# Patient Record
Sex: MALE | Race: WHITE | NOT HISPANIC OR LATINO | Employment: OTHER | URBAN - METROPOLITAN AREA
[De-identification: names, ages, dates, MRNs, and addresses within clinical notes are randomized per-mention and may not be internally consistent; named-entity substitution may affect disease eponyms.]

---

## 2018-05-14 ENCOUNTER — RECORDS - HEALTHEAST (OUTPATIENT)
Dept: LAB | Facility: CLINIC | Age: 78
End: 2018-05-14

## 2018-05-14 LAB
ALBUMIN SERPL-MCNC: 3.5 G/DL (ref 3.5–5)
ALP SERPL-CCNC: 99 U/L (ref 45–120)
ALT SERPL W P-5'-P-CCNC: 18 U/L (ref 0–45)
ANION GAP SERPL CALCULATED.3IONS-SCNC: 10 MMOL/L (ref 5–18)
AST SERPL W P-5'-P-CCNC: 17 U/L (ref 0–40)
BASOPHILS # BLD AUTO: 0 THOU/UL (ref 0–0.2)
BASOPHILS NFR BLD AUTO: 0 % (ref 0–2)
BILIRUB SERPL-MCNC: 0.5 MG/DL (ref 0–1)
BUN SERPL-MCNC: 20 MG/DL (ref 8–28)
CALCIUM SERPL-MCNC: 9.1 MG/DL (ref 8.5–10.5)
CHLORIDE BLD-SCNC: 105 MMOL/L (ref 98–107)
CHOLEST SERPL-MCNC: 153 MG/DL
CO2 SERPL-SCNC: 26 MMOL/L (ref 22–31)
CREAT SERPL-MCNC: 1.3 MG/DL (ref 0.7–1.3)
EOSINOPHIL # BLD AUTO: 0.1 THOU/UL (ref 0–0.4)
EOSINOPHIL NFR BLD AUTO: 1 % (ref 0–6)
ERYTHROCYTE [DISTWIDTH] IN BLOOD BY AUTOMATED COUNT: 12.5 % (ref 11–14.5)
FASTING STATUS PATIENT QL REPORTED: NORMAL
GFR SERPL CREATININE-BSD FRML MDRD: 54 ML/MIN/1.73M2
GLUCOSE BLD-MCNC: 187 MG/DL (ref 70–125)
HCT VFR BLD AUTO: 28.9 % (ref 40–54)
HDLC SERPL-MCNC: 55 MG/DL
HGB BLD-MCNC: 9.9 G/DL (ref 14–18)
LDLC SERPL CALC-MCNC: 78 MG/DL
LYMPHOCYTES # BLD AUTO: 0.7 THOU/UL (ref 0.8–4.4)
LYMPHOCYTES NFR BLD AUTO: 10 % (ref 20–40)
MCH RBC QN AUTO: 32.9 PG (ref 27–34)
MCHC RBC AUTO-ENTMCNC: 34.3 G/DL (ref 32–36)
MCV RBC AUTO: 96 FL (ref 80–100)
MONOCYTES # BLD AUTO: 0.5 THOU/UL (ref 0–0.9)
MONOCYTES NFR BLD AUTO: 7 % (ref 2–10)
NEUTROPHILS # BLD AUTO: 5.4 THOU/UL (ref 2–7.7)
NEUTROPHILS NFR BLD AUTO: 82 % (ref 50–70)
PLATELET # BLD AUTO: 210 THOU/UL (ref 140–440)
PMV BLD AUTO: 11.7 FL (ref 8.5–12.5)
POTASSIUM BLD-SCNC: 3.8 MMOL/L (ref 3.5–5)
PROT SERPL-MCNC: 6.8 G/DL (ref 6–8)
RBC # BLD AUTO: 3.01 MILL/UL (ref 4.4–6.2)
SODIUM SERPL-SCNC: 141 MMOL/L (ref 136–145)
TRIGL SERPL-MCNC: 102 MG/DL
WBC: 6.7 THOU/UL (ref 4–11)

## 2019-05-16 ENCOUNTER — RECORDS - HEALTHEAST (OUTPATIENT)
Dept: LAB | Facility: CLINIC | Age: 79
End: 2019-05-16

## 2019-05-16 LAB
ALBUMIN SERPL-MCNC: 3.9 G/DL (ref 3.5–5)
ALP SERPL-CCNC: 85 U/L (ref 45–120)
ALT SERPL W P-5'-P-CCNC: 19 U/L (ref 0–45)
ANION GAP SERPL CALCULATED.3IONS-SCNC: 12 MMOL/L (ref 5–18)
AST SERPL W P-5'-P-CCNC: 21 U/L (ref 0–40)
BILIRUB SERPL-MCNC: 0.5 MG/DL (ref 0–1)
BUN SERPL-MCNC: 29 MG/DL (ref 8–28)
CALCIUM SERPL-MCNC: 9.4 MG/DL (ref 8.5–10.5)
CHLORIDE BLD-SCNC: 104 MMOL/L (ref 98–107)
CHOLEST SERPL-MCNC: 179 MG/DL
CO2 SERPL-SCNC: 22 MMOL/L (ref 22–31)
CREAT SERPL-MCNC: 1.28 MG/DL (ref 0.7–1.3)
FASTING STATUS PATIENT QL REPORTED: NORMAL
GFR SERPL CREATININE-BSD FRML MDRD: 54 ML/MIN/1.73M2
GLUCOSE BLD-MCNC: 88 MG/DL (ref 70–125)
HDLC SERPL-MCNC: 59 MG/DL
LDLC SERPL CALC-MCNC: 97 MG/DL
POTASSIUM BLD-SCNC: 4.6 MMOL/L (ref 3.5–5)
PROT SERPL-MCNC: 7 G/DL (ref 6–8)
SODIUM SERPL-SCNC: 138 MMOL/L (ref 136–145)
TRIGL SERPL-MCNC: 117 MG/DL

## 2019-11-25 ENCOUNTER — RECORDS - HEALTHEAST (OUTPATIENT)
Dept: LAB | Facility: CLINIC | Age: 79
End: 2019-11-25

## 2019-11-25 LAB
ALBUMIN SERPL-MCNC: 4 G/DL (ref 3.5–5)
ALP SERPL-CCNC: 89 U/L (ref 45–120)
ALT SERPL W P-5'-P-CCNC: 47 U/L (ref 0–45)
ANION GAP SERPL CALCULATED.3IONS-SCNC: 12 MMOL/L (ref 5–18)
AST SERPL W P-5'-P-CCNC: 41 U/L (ref 0–40)
BILIRUB SERPL-MCNC: 0.5 MG/DL (ref 0–1)
BUN SERPL-MCNC: 17 MG/DL (ref 8–28)
CALCIUM SERPL-MCNC: 9.1 MG/DL (ref 8.5–10.5)
CHLORIDE BLD-SCNC: 107 MMOL/L (ref 98–107)
CHOLEST SERPL-MCNC: 182 MG/DL
CO2 SERPL-SCNC: 20 MMOL/L (ref 22–31)
CREAT SERPL-MCNC: 0.98 MG/DL (ref 0.7–1.3)
ERYTHROCYTE [DISTWIDTH] IN BLOOD BY AUTOMATED COUNT: 12.3 % (ref 11–14.5)
FASTING STATUS PATIENT QL REPORTED: NORMAL
FERRITIN SERPL-MCNC: 98 NG/ML (ref 27–300)
GFR SERPL CREATININE-BSD FRML MDRD: >60 ML/MIN/1.73M2
GLUCOSE BLD-MCNC: 95 MG/DL (ref 70–125)
HCT VFR BLD AUTO: 39 % (ref 40–54)
HDLC SERPL-MCNC: 65 MG/DL
HGB BLD-MCNC: 13.5 G/DL (ref 14–18)
IRON SATN MFR SERPL: 16 % (ref 20–50)
IRON SERPL-MCNC: 58 UG/DL (ref 42–175)
LDLC SERPL CALC-MCNC: 103 MG/DL
MCH RBC QN AUTO: 32 PG (ref 27–34)
MCHC RBC AUTO-ENTMCNC: 34.6 G/DL (ref 32–36)
MCV RBC AUTO: 92 FL (ref 80–100)
PLATELET # BLD AUTO: 161 THOU/UL (ref 140–440)
PMV BLD AUTO: 11.9 FL (ref 8.5–12.5)
POTASSIUM BLD-SCNC: 4 MMOL/L (ref 3.5–5)
PROT SERPL-MCNC: 7.1 G/DL (ref 6–8)
RBC # BLD AUTO: 4.22 MILL/UL (ref 4.4–6.2)
SODIUM SERPL-SCNC: 139 MMOL/L (ref 136–145)
TIBC SERPL-MCNC: 372 UG/DL (ref 313–563)
TRANSFERRIN SERPL-MCNC: 298 MG/DL (ref 212–360)
TRIGL SERPL-MCNC: 68 MG/DL
WBC: 7 THOU/UL (ref 4–11)

## 2019-11-26 LAB — HBA1C MFR BLD: 5.6 % (ref 4.2–6.1)

## 2024-07-11 ENCOUNTER — APPOINTMENT (OUTPATIENT)
Dept: CT IMAGING | Facility: CLINIC | Age: 84
DRG: 064 | End: 2024-07-11
Attending: EMERGENCY MEDICINE
Payer: MEDICARE

## 2024-07-11 ENCOUNTER — APPOINTMENT (OUTPATIENT)
Dept: MRI IMAGING | Facility: CLINIC | Age: 84
DRG: 064 | End: 2024-07-11
Attending: EMERGENCY MEDICINE
Payer: MEDICARE

## 2024-07-11 ENCOUNTER — HOSPITAL ENCOUNTER (INPATIENT)
Facility: CLINIC | Age: 84
LOS: 1 days | Discharge: HOME | DRG: 064 | End: 2024-07-12
Attending: EMERGENCY MEDICINE | Admitting: INTERNAL MEDICINE
Payer: MEDICARE

## 2024-07-11 DIAGNOSIS — D50.9 IRON DEFICIENCY ANEMIA, UNSPECIFIED IRON DEFICIENCY ANEMIA TYPE: Primary | ICD-10-CM

## 2024-07-11 DIAGNOSIS — I63.9 ACUTE CVA (CEREBROVASCULAR ACCIDENT) (H): ICD-10-CM

## 2024-07-11 DIAGNOSIS — H53.8 BLURRED VISION: ICD-10-CM

## 2024-07-11 LAB
ALBUMIN SERPL BCG-MCNC: 3.7 G/DL (ref 3.5–5.2)
ALBUMIN UR-MCNC: 20 MG/DL
ALP SERPL-CCNC: 106 U/L (ref 40–150)
ALT SERPL W P-5'-P-CCNC: 10 U/L (ref 0–70)
ANION GAP SERPL CALCULATED.3IONS-SCNC: 11 MMOL/L (ref 7–15)
APPEARANCE UR: CLEAR
AST SERPL W P-5'-P-CCNC: 17 U/L (ref 0–45)
ATRIAL RATE - MUSE: 61 BPM
BASOPHILS # BLD AUTO: 0 10E3/UL (ref 0–0.2)
BASOPHILS NFR BLD AUTO: 0 %
BILIRUB DIRECT SERPL-MCNC: <0.2 MG/DL (ref 0–0.3)
BILIRUB SERPL-MCNC: 0.2 MG/DL
BILIRUB UR QL STRIP: NEGATIVE
BUN SERPL-MCNC: 20.2 MG/DL (ref 8–23)
CALCIUM SERPL-MCNC: 9.2 MG/DL (ref 8.8–10.2)
CHLORIDE SERPL-SCNC: 103 MMOL/L (ref 98–107)
COLOR UR AUTO: YELLOW
CREAT SERPL-MCNC: 0.95 MG/DL (ref 0.67–1.17)
DEPRECATED HCO3 PLAS-SCNC: 23 MMOL/L (ref 22–29)
DIASTOLIC BLOOD PRESSURE - MUSE: NORMAL MMHG
EGFRCR SERPLBLD CKD-EPI 2021: 80 ML/MIN/1.73M2
EOSINOPHIL # BLD AUTO: 0.2 10E3/UL (ref 0–0.7)
EOSINOPHIL NFR BLD AUTO: 3 %
ERYTHROCYTE [DISTWIDTH] IN BLOOD BY AUTOMATED COUNT: 14.9 % (ref 10–15)
GLUCOSE BLDC GLUCOMTR-MCNC: 111 MG/DL (ref 70–99)
GLUCOSE SERPL-MCNC: 90 MG/DL (ref 70–99)
GLUCOSE UR STRIP-MCNC: 200 MG/DL
HBA1C MFR BLD: 6.1 %
HCT VFR BLD AUTO: 30.1 % (ref 40–53)
HGB BLD-MCNC: 9.7 G/DL (ref 13.3–17.7)
HGB UR QL STRIP: NEGATIVE
HYALINE CASTS: 1 /LPF
IMM GRANULOCYTES # BLD: 0 10E3/UL
IMM GRANULOCYTES NFR BLD: 0 %
INTERPRETATION ECG - MUSE: NORMAL
IRON BINDING CAPACITY (ROCHE): 242 UG/DL (ref 240–430)
IRON SATN MFR SERPL: 9 % (ref 15–46)
IRON SERPL-MCNC: 21 UG/DL (ref 61–157)
KETONES UR STRIP-MCNC: NEGATIVE MG/DL
LEUKOCYTE ESTERASE UR QL STRIP: NEGATIVE
LYMPHOCYTES # BLD AUTO: 0.5 10E3/UL (ref 0.8–5.3)
LYMPHOCYTES NFR BLD AUTO: 8 %
MAGNESIUM SERPL-MCNC: 2 MG/DL (ref 1.7–2.3)
MCH RBC QN AUTO: 26.9 PG (ref 26.5–33)
MCHC RBC AUTO-ENTMCNC: 32.2 G/DL (ref 31.5–36.5)
MCV RBC AUTO: 84 FL (ref 78–100)
MONOCYTES # BLD AUTO: 0.6 10E3/UL (ref 0–1.3)
MONOCYTES NFR BLD AUTO: 9 %
MUCOUS THREADS #/AREA URNS LPF: PRESENT /LPF
NEUTROPHILS # BLD AUTO: 5 10E3/UL (ref 1.6–8.3)
NEUTROPHILS NFR BLD AUTO: 80 %
NITRATE UR QL: NEGATIVE
NRBC # BLD AUTO: 0 10E3/UL
NRBC BLD AUTO-RTO: 0 /100
P AXIS - MUSE: 52 DEGREES
PH UR STRIP: 5.5 [PH] (ref 5–7)
PLATELET # BLD AUTO: 212 10E3/UL (ref 150–450)
POTASSIUM SERPL-SCNC: 4.5 MMOL/L (ref 3.4–5.3)
PR INTERVAL - MUSE: 218 MS
PROT SERPL-MCNC: 7.1 G/DL (ref 6.4–8.3)
QRS DURATION - MUSE: 72 MS
QT - MUSE: 414 MS
QTC - MUSE: 416 MS
R AXIS - MUSE: 8 DEGREES
RBC # BLD AUTO: 3.6 10E6/UL (ref 4.4–5.9)
RBC URINE: <1 /HPF
SODIUM SERPL-SCNC: 137 MMOL/L (ref 135–145)
SP GR UR STRIP: 1.02 (ref 1–1.03)
SYSTOLIC BLOOD PRESSURE - MUSE: NORMAL MMHG
T AXIS - MUSE: 36 DEGREES
TROPONIN T SERPL HS-MCNC: 12 NG/L
UROBILINOGEN UR STRIP-MCNC: <2 MG/DL
VENTRICULAR RATE- MUSE: 61 BPM
WBC # BLD AUTO: 6.3 10E3/UL (ref 4–11)
WBC URINE: 1 /HPF

## 2024-07-11 PROCEDURE — 93005 ELECTROCARDIOGRAM TRACING: CPT | Performed by: EMERGENCY MEDICINE

## 2024-07-11 PROCEDURE — 70496 CT ANGIOGRAPHY HEAD: CPT | Mod: MG

## 2024-07-11 PROCEDURE — 80061 LIPID PANEL: CPT | Performed by: INTERNAL MEDICINE

## 2024-07-11 PROCEDURE — 84484 ASSAY OF TROPONIN QUANT: CPT | Performed by: EMERGENCY MEDICINE

## 2024-07-11 PROCEDURE — 99223 1ST HOSP IP/OBS HIGH 75: CPT | Mod: AI | Performed by: INTERNAL MEDICINE

## 2024-07-11 PROCEDURE — 83550 IRON BINDING TEST: CPT | Performed by: INTERNAL MEDICINE

## 2024-07-11 PROCEDURE — 250N000013 HC RX MED GY IP 250 OP 250 PS 637: Performed by: EMERGENCY MEDICINE

## 2024-07-11 PROCEDURE — 82310 ASSAY OF CALCIUM: CPT | Performed by: EMERGENCY MEDICINE

## 2024-07-11 PROCEDURE — 99285 EMERGENCY DEPT VISIT HI MDM: CPT | Mod: 25

## 2024-07-11 PROCEDURE — 82962 GLUCOSE BLOOD TEST: CPT

## 2024-07-11 PROCEDURE — 120N000001 HC R&B MED SURG/OB

## 2024-07-11 PROCEDURE — A9585 GADOBUTROL INJECTION: HCPCS | Performed by: EMERGENCY MEDICINE

## 2024-07-11 PROCEDURE — 80053 COMPREHEN METABOLIC PANEL: CPT | Performed by: EMERGENCY MEDICINE

## 2024-07-11 PROCEDURE — 85025 COMPLETE CBC W/AUTO DIFF WBC: CPT | Performed by: EMERGENCY MEDICINE

## 2024-07-11 PROCEDURE — 83036 HEMOGLOBIN GLYCOSYLATED A1C: CPT | Performed by: INTERNAL MEDICINE

## 2024-07-11 PROCEDURE — 83735 ASSAY OF MAGNESIUM: CPT | Performed by: INTERNAL MEDICINE

## 2024-07-11 PROCEDURE — 255N000002 HC RX 255 OP 636: Performed by: EMERGENCY MEDICINE

## 2024-07-11 PROCEDURE — 81001 URINALYSIS AUTO W/SCOPE: CPT | Performed by: EMERGENCY MEDICINE

## 2024-07-11 PROCEDURE — 36415 COLL VENOUS BLD VENIPUNCTURE: CPT | Performed by: EMERGENCY MEDICINE

## 2024-07-11 PROCEDURE — 70553 MRI BRAIN STEM W/O & W/DYE: CPT | Mod: MG

## 2024-07-11 PROCEDURE — 250N000011 HC RX IP 250 OP 636: Performed by: EMERGENCY MEDICINE

## 2024-07-11 PROCEDURE — 82728 ASSAY OF FERRITIN: CPT | Performed by: INTERNAL MEDICINE

## 2024-07-11 RX ORDER — AMOXICILLIN 250 MG
2 CAPSULE ORAL 2 TIMES DAILY PRN
Status: DISCONTINUED | OUTPATIENT
Start: 2024-07-11 | End: 2024-07-12 | Stop reason: HOSPADM

## 2024-07-11 RX ORDER — SIMVASTATIN 5 MG
5 TABLET ORAL AT BEDTIME
Status: DISCONTINUED | OUTPATIENT
Start: 2024-07-11 | End: 2024-07-12

## 2024-07-11 RX ORDER — ACETAMINOPHEN 650 MG/1
650 SUPPOSITORY RECTAL EVERY 4 HOURS PRN
Status: DISCONTINUED | OUTPATIENT
Start: 2024-07-11 | End: 2024-07-12 | Stop reason: HOSPADM

## 2024-07-11 RX ORDER — PROCHLORPERAZINE 25 MG
12.5 SUPPOSITORY, RECTAL RECTAL EVERY 12 HOURS PRN
Status: DISCONTINUED | OUTPATIENT
Start: 2024-07-11 | End: 2024-07-12 | Stop reason: HOSPADM

## 2024-07-11 RX ORDER — SIMVASTATIN 5 MG
5 TABLET ORAL AT BEDTIME
Status: DISCONTINUED | OUTPATIENT
Start: 2024-07-11 | End: 2024-07-11

## 2024-07-11 RX ORDER — CLOPIDOGREL BISULFATE 75 MG/1
300 TABLET ORAL ONCE
Status: COMPLETED | OUTPATIENT
Start: 2024-07-11 | End: 2024-07-11

## 2024-07-11 RX ORDER — CALCIUM CARBONATE 500 MG/1
1000 TABLET, CHEWABLE ORAL 4 TIMES DAILY PRN
Status: DISCONTINUED | OUTPATIENT
Start: 2024-07-11 | End: 2024-07-12 | Stop reason: HOSPADM

## 2024-07-11 RX ORDER — LABETALOL HYDROCHLORIDE 5 MG/ML
10-20 INJECTION, SOLUTION INTRAVENOUS EVERY 10 MIN PRN
Status: DISCONTINUED | OUTPATIENT
Start: 2024-07-11 | End: 2024-07-12 | Stop reason: HOSPADM

## 2024-07-11 RX ORDER — LIDOCAINE 40 MG/G
CREAM TOPICAL
Status: DISCONTINUED | OUTPATIENT
Start: 2024-07-11 | End: 2024-07-12 | Stop reason: HOSPADM

## 2024-07-11 RX ORDER — IOPAMIDOL 755 MG/ML
75 INJECTION, SOLUTION INTRAVASCULAR ONCE
Status: COMPLETED | OUTPATIENT
Start: 2024-07-11 | End: 2024-07-11

## 2024-07-11 RX ORDER — LISINOPRIL 10 MG/1
10 TABLET ORAL DAILY
COMMUNITY
Start: 2023-11-30 | End: 2024-07-12

## 2024-07-11 RX ORDER — CLOPIDOGREL BISULFATE 75 MG/1
75 TABLET ORAL DAILY
Status: DISCONTINUED | OUTPATIENT
Start: 2024-07-12 | End: 2024-07-12 | Stop reason: HOSPADM

## 2024-07-11 RX ORDER — ONDANSETRON 2 MG/ML
4 INJECTION INTRAMUSCULAR; INTRAVENOUS EVERY 6 HOURS PRN
Status: DISCONTINUED | OUTPATIENT
Start: 2024-07-11 | End: 2024-07-12 | Stop reason: HOSPADM

## 2024-07-11 RX ORDER — ACETAMINOPHEN 325 MG/1
650 TABLET ORAL EVERY 4 HOURS PRN
Status: DISCONTINUED | OUTPATIENT
Start: 2024-07-11 | End: 2024-07-12 | Stop reason: HOSPADM

## 2024-07-11 RX ORDER — ASPIRIN 325 MG
325 TABLET ORAL ONCE
Status: COMPLETED | OUTPATIENT
Start: 2024-07-11 | End: 2024-07-11

## 2024-07-11 RX ORDER — SODIUM CHLORIDE 9 MG/ML
INJECTION, SOLUTION INTRAVENOUS CONTINUOUS
Status: DISCONTINUED | OUTPATIENT
Start: 2024-07-11 | End: 2024-07-12

## 2024-07-11 RX ORDER — SIMVASTATIN 5 MG
5 TABLET ORAL AT BEDTIME
COMMUNITY
Start: 2023-11-30 | End: 2024-07-12

## 2024-07-11 RX ORDER — ONDANSETRON 4 MG/1
4 TABLET, ORALLY DISINTEGRATING ORAL EVERY 6 HOURS PRN
Status: DISCONTINUED | OUTPATIENT
Start: 2024-07-11 | End: 2024-07-12 | Stop reason: HOSPADM

## 2024-07-11 RX ORDER — ASPIRIN 81 MG/1
81 TABLET ORAL DAILY
Status: DISCONTINUED | OUTPATIENT
Start: 2024-07-12 | End: 2024-07-11

## 2024-07-11 RX ORDER — MULTIVITAMIN,THERAPEUTIC
1 TABLET ORAL DAILY
COMMUNITY

## 2024-07-11 RX ORDER — ASPIRIN 81 MG/1
81 TABLET, CHEWABLE ORAL DAILY
Status: DISCONTINUED | OUTPATIENT
Start: 2024-07-12 | End: 2024-07-11

## 2024-07-11 RX ORDER — ASPIRIN 325 MG
325 TABLET ORAL DAILY
Status: DISCONTINUED | OUTPATIENT
Start: 2024-07-12 | End: 2024-07-12 | Stop reason: HOSPADM

## 2024-07-11 RX ORDER — AMOXICILLIN 250 MG
1 CAPSULE ORAL 2 TIMES DAILY PRN
Status: DISCONTINUED | OUTPATIENT
Start: 2024-07-11 | End: 2024-07-12 | Stop reason: HOSPADM

## 2024-07-11 RX ORDER — GADOBUTROL 604.72 MG/ML
6 INJECTION INTRAVENOUS ONCE
Status: COMPLETED | OUTPATIENT
Start: 2024-07-11 | End: 2024-07-11

## 2024-07-11 RX ORDER — HYDRALAZINE HYDROCHLORIDE 20 MG/ML
10-20 INJECTION INTRAMUSCULAR; INTRAVENOUS
Status: DISCONTINUED | OUTPATIENT
Start: 2024-07-11 | End: 2024-07-12 | Stop reason: HOSPADM

## 2024-07-11 RX ORDER — COVID-19 ANTIGEN TEST
220 KIT MISCELLANEOUS 2 TIMES DAILY PRN
COMMUNITY
End: 2024-07-12

## 2024-07-11 RX ORDER — PROCHLORPERAZINE MALEATE 5 MG
5 TABLET ORAL EVERY 6 HOURS PRN
Status: DISCONTINUED | OUTPATIENT
Start: 2024-07-11 | End: 2024-07-12 | Stop reason: HOSPADM

## 2024-07-11 RX ADMIN — IOPAMIDOL 75 ML: 755 INJECTION, SOLUTION INTRAVENOUS at 19:28

## 2024-07-11 RX ADMIN — ASPIRIN 325 MG ORAL TABLET 325 MG: 325 PILL ORAL at 21:56

## 2024-07-11 RX ADMIN — CLOPIDOGREL BISULFATE 300 MG: 75 TABLET ORAL at 21:56

## 2024-07-11 RX ADMIN — GADOBUTROL 6 ML: 604.72 INJECTION INTRAVENOUS at 20:55

## 2024-07-11 ASSESSMENT — COLUMBIA-SUICIDE SEVERITY RATING SCALE - C-SSRS
6. HAVE YOU EVER DONE ANYTHING, STARTED TO DO ANYTHING, OR PREPARED TO DO ANYTHING TO END YOUR LIFE?: NO
1. IN THE PAST MONTH, HAVE YOU WISHED YOU WERE DEAD OR WISHED YOU COULD GO TO SLEEP AND NOT WAKE UP?: NO
2. HAVE YOU ACTUALLY HAD ANY THOUGHTS OF KILLING YOURSELF IN THE PAST MONTH?: NO

## 2024-07-11 ASSESSMENT — ACTIVITIES OF DAILY LIVING (ADL)
ADLS_ACUITY_SCORE: 35

## 2024-07-11 NOTE — ED TRIAGE NOTES
Pt c/o worsening confusion and weight loss for several months. Now experiencing intermittent blurry vision for several days. Denies falls, trauma, or LOC. Denies headache, balance issues, or speech problems.     Triage Assessment (Adult)       Row Name 07/11/24 5150          Triage Assessment    Airway WDL WDL        Respiratory WDL    Respiratory WDL WDL        Skin Circulation/Temperature WDL    Skin Circulation/Temperature WDL WDL        Cardiac WDL    Cardiac WDL WDL        Peripheral/Neurovascular WDL    Peripheral Neurovascular WDL WDL        Cognitive/Neuro/Behavioral WDL    Cognitive/Neuro/Behavioral WDL X     Level of Consciousness confused        [place] Coma Scale    Best Eye Response 4-->(E4) spontaneous     Best Motor Response 6-->(M6) obeys commands     Best Verbal Response 5-->(V5) oriented     Regi Coma Scale Score 15

## 2024-07-11 NOTE — ED PROVIDER NOTES
EMERGENCY DEPARTMENT ENCOUNTER      NAME: Juan Thompson  AGE: 84 year old male  YOB: 1940  MRN: [id number]  EVALUATION DATE & TIME: No admission date for patient encounter.    PCP: No primary care provider on file.    ED PROVIDER: Kelby Lim D.O.      Chief Complaint   Patient presents with    Altered Mental Status    Eye Problem       FINAL IMPRESSION:  1. Acute CVA (cerebrovascular accident) (H)        ED COURSE & MEDICAL DECISION MAKIN:22 PM I met with the patient to gather history and to perform my initial exam. I discussed the plan for care while in the Emergency Department.  9:33 PM Rechecked and updated patient and his daughter. Discussed plans for admission, they are both amenable.  9:38 PM Paged Stroke Neurology, Dr. Patel.  9:43 PM Discussed patient with Stroke Neurology, Dr. Patel.  9:47 PM Paged hospitalist.  10:10 PM Discussed with Dr. Hickman, hospitalist, who accepts patient for admission.          Pertinent Labs & Imaging studies reviewed. (See chart for details)  84 year old male presents to the Emergency Department for evaluation of confusion and vision changes.  Last known normal was approximately 3 days ago.  Initial exam, had an NIHSS of 0, and the patient just reporting intermittent blurry vision.  However, based on the symptoms and the concern from family for altered mental status, did workup the patient for both CVA and possible infection as well as underlying other cause of confusion.  UA was negative, and there is no obvious infectious etiology.  There was no electrolyte abnormalities that these explain his symptoms, and lab testing was otherwise largely unremarkable.  EKG did not show any evidence of arrhythmia, and he has no known history of atrial fibrillation.  CTA did show some vertebral artery occlusion, but otherwise was largely unremarkable in this patient.  Unfortunately MRI does show obvious scattered foci of acute infarct, and in an area that the  distribution would explain at least some of the patient's symptoms.  Therefore I consulted with stroke neurology who requested we start him on aspirin and Plavix and we will place other recommendations in the chart likely to evaluate the patient in the morning.  Discussed the patient with the hospitalist who agreed to the admission.    Medical Decision Making  Obtained supplemental history:Supplemental history obtained?: Family Member/Significant Other  Reviewed external records: External records reviewed?: Outpatient Record: [facility] 07/11/2024  Care impacted by chronic illness:Hyperlipidemia, Hypertension, and Other: Arthritis  Care significantly affected by social determinants of health:N/A  Did you consider but not order tests?: Work up considered but not performed and documented in chart, if applicable  Did you interpret images independently?: Independent interpretation of ECG and images noted in documentation, when applicable.  Consultation discussion with other provider:Did you involve another provider (consultant, , pharmacy, etc.)?: I discussed the care with another health care provider, see documentation for details.  Admit.    At the conclusion of the encounter I discussed the results of all of the tests and the disposition. The questions were answered. The patient or family acknowledged understanding and was agreeable with the care plan.      HPI    Patient information was obtained from: Patient and daughter.    Use of : N/A      Juan Thompson is a 84 year old male who presents with worsening confusion, vision changes, and word-finding difficulty over the past few days. Vision changes include blurry vision, intermittent black spots, and difficulty focusing. Per his daughter, the patient returned from [place] 4 days ago and was not responding to her calls after his return. She has not seen him for 2 months, so she is unsure of the timeframe for his current  "symptoms. Today, she was able to talk to him over the phone and states that he sounded confused, was asking repetitive questions, and had word-finding difficulty, which is abnormal for him. The patient stated that he was \"in crisis\" due to his vision changes, so they went to urgent care and were directed to this ED. The patient has been unsteady on his feet and has lost a significant amount of weight per his daughter. She reports worsening memory and confusion over the past year, but not to this extent.    The patient is prescribed lisinopril and simvastatin, but has not taken simvastatin for 4 days. He reports a history of arthritis. He denies numbness, tingling, or issues moving his extremities. No slurred speech per daughter.    Per Chart Review: Patient was [facility] today for evaluation of 1 day of visual changes and confusion. Patient had just returned from [place], family member reports significant weight loss and had not taken simvastatin for a few days. Patient was directed to present to this ED for further workup.      PAST MEDICAL HISTORY:  No past medical history on file.    PAST SURGICAL HISTORY:  No past surgical history on file.      CURRENT MEDICATIONS:    No current facility-administered medications for this encounter.     Current Outpatient Medications   Medication Sig Dispense Refill    diclofenac (VOLTAREN) 1 % topical gel Apply 2 g topically 4 times daily as needed for moderate pain      lisinopril (ZESTRIL) 10 MG tablet Take 10 mg by mouth daily      multivitamin, therapeutic (THERA-VIT) TABS tablet Take 1 tablet by mouth daily      naproxen sodium 220 MG capsule Take 220 mg by mouth 2 times daily as needed      simvastatin (ZOCOR) 5 MG tablet Take 5 mg by mouth at bedtime           ALLERGIES:  No Known Allergies    FAMILY HISTORY:  No family history on file.    SOCIAL HISTORY:       VITALS:  Patient Vitals for the past 24 hrs:   BP Temp Temp src Pulse Resp " "SpO2 Height Weight   07/11/24 1958 (!) 157/74 -- -- 64 18 98 % -- --   07/11/24 1943 (!) 185/81 -- -- 67 (!) 36 99 % -- --   07/11/24 1900 (!) 174/73 -- -- 65 20 96 % -- --   07/11/24 1845 (!) 148/70 -- -- 63 25 97 % -- --   07/11/24 1653 139/76 97.9  F (36.6  C) Oral 76 18 97 % 1.702 m (5' 7\") 60.8 kg (134 lb)       PHYSICAL EXAM    VITAL SIGNS: BP (!) 157/74   Pulse 64   Temp 97.9  F (36.6  C) (Oral)   Resp 18   Ht 1.702 m (5' 7\")   Wt 60.8 kg (134 lb)   SpO2 98%   BMI 20.99 kg/m      General Appearance: Well-appearing, well-nourished, no acute distress.  Head:  Normocephalic, without obvious abnormality, atraumatic  Eyes:  PERRL, conjunctiva/corneas clear, EOM's intact,  ENT:  Lips, mucosa, and tongue normal, membranes are moist without pallor  Neck:  Normal ROM, symmetrical, trachea midline    Cardio:  Regular rate and rhythm, no murmur, rub or gallop, 2+ pulses symmetric in all extremities  Pulm:  Clear to auscultation bilaterally, respirations unlabored,  Abdomen:  Soft, non-tender, no rebound or guarding.  Musculoskeletal: Full ROM, no edema, no cyanosis, good ROM of major joints  Integument:  Warm, Dry, No erythema, No rash.    Neurologic:  Patient is alert and oriented ×3.  Face is symmetric.  Speech is normal.  Visual fields are full.  Cranial nerves II through XII are grossly intact. Motor tone is 5/5 and equal in both upper and lower extremities.  Bilateral symmetric sensation grossly intact upper and lower.  Neg finger-nose. Neg heel-shin.          [employer] Stroke Scale  Exam Interval: Baseline   Score    Level of consciousness: (0)   Alert, keenly responsive    LOC questions: (0)   Answers both questions correctly    LOC commands: (0)   Performs both tasks correctly    Best gaze: (0)   Normal    Visual: (0)   No visual loss    Facial palsy: (0)   Normal symmetrical movements    Motor arm (left): (0)   No drift    Motor arm (right): (0)   No drift    Motor leg (left): (0)   " No drift    Motor leg (right): (0)   No drift    Limb ataxia: (0)   Absent    Sensory: (0)   Normal- no sensory loss    Best language: (0)   Normal- no aphasia    Dysarthria: (0)   Normal    Extinction and inattention: (0)   No abnormality        Total Score:  0       LABS  Results for orders placed or performed during the hospital encounter of 07/11/24 (from the past 24 hour(s))   Glucose by meter   Result Value Ref Range    GLUCOSE BY METER POCT 111 (H) 70 - 99 mg/dL   UA with Microscopic reflex to Culture    Specimen: Urine, Clean Catch   Result Value Ref Range    Color Urine Yellow Colorless, Straw, Light Yellow, Yellow    Appearance Urine Clear Clear    Glucose Urine 200 (A) Negative mg/dL    Bilirubin Urine Negative Negative    Ketones Urine Negative Negative mg/dL    Specific Gravity Urine 1.025 1.001 - 1.030    Blood Urine Negative Negative    pH Urine 5.5 5.0 - 7.0    Protein Albumin Urine 20 (A) Negative mg/dL    Urobilinogen Urine <2.0 <2.0 mg/dL    Nitrite Urine Negative Negative    Leukocyte Esterase Urine Negative Negative    Mucus Urine Present (A) None Seen /LPF    RBC Urine <1 <=2 /HPF    WBC Urine 1 <=5 /HPF    Hyaline Casts Urine 1 <=2 /LPF    Narrative    Urine Culture not indicated   ECG 12-LEAD WITH MUSE (LHE)   Result Value Ref Range    Systolic Blood Pressure  mmHg    Diastolic Blood Pressure  mmHg    Ventricular Rate 61 BPM    Atrial Rate 61 BPM    LA Interval 218 ms    QRS Duration 72 ms     ms    QTc 416 ms    P Axis 52 degrees    R AXIS 8 degrees    T Axis 36 degrees    Interpretation ECG       Sinus rhythm with 1st degree A-V block  Otherwise normal ECG  No previous ECGs available  Confirmed by SEE ED PROVIDER NOTE FOR, ECG INTERPRETATION (4000),  RAJINDER ZHENG ([id number]) on 7/11/2024 6:02:31 PM     CBC with platelets + differential    Narrative    The following orders were created for panel order CBC with platelets + differential.  Procedure                                Abnormality         Status                     ---------                               -----------         ------                     CBC with platelets and d...[[id number]]  Abnormal            Final result                 Please view results for these tests on the individual orders.   Basic metabolic panel   Result Value Ref Range    Sodium 137 135 - 145 mmol/L    Potassium 4.5 3.4 - 5.3 mmol/L    Chloride 103 98 - 107 mmol/L    Carbon Dioxide (CO2) 23 22 - 29 mmol/L    Anion Gap 11 7 - 15 mmol/L    Urea Nitrogen 20.2 8.0 - 23.0 mg/dL    Creatinine 0.95 0.67 - 1.17 mg/dL    GFR Estimate 80 >60 mL/min/1.73m2    Calcium 9.2 8.8 - 10.2 mg/dL    Glucose 90 70 - 99 mg/dL   Hepatic function panel   Result Value Ref Range    Protein Total 7.1 6.4 - 8.3 g/dL    Albumin 3.7 3.5 - 5.2 g/dL    Bilirubin Total 0.2 <=1.2 mg/dL    Alkaline Phosphatase 106 40 - 150 U/L    AST 17 0 - 45 U/L    ALT 10 0 - 70 U/L    Bilirubin Direct <0.20 0.00 - 0.30 mg/dL   Troponin T, High Sensitivity (now)   Result Value Ref Range    Troponin T, High Sensitivity 12 <=22 ng/L   CBC with platelets and differential   Result Value Ref Range    WBC Count 6.3 4.0 - 11.0 10e3/uL    RBC Count 3.60 (L) 4.40 - 5.90 10e6/uL    Hemoglobin 9.7 (L) 13.3 - 17.7 g/dL    Hematocrit 30.1 (L) 40.0 - 53.0 %    MCV 84 78 - 100 fL    MCH 26.9 26.5 - 33.0 pg    MCHC 32.2 31.5 - 36.5 g/dL    RDW 14.9 10.0 - 15.0 %    Platelet Count 212 150 - 450 10e3/uL    % Neutrophils 80 %    % Lymphocytes 8 %    % Monocytes 9 %    % Eosinophils 3 %    % Basophils 0 %    % Immature Granulocytes 0 %    NRBCs per 100 WBC 0 <1 /100    Absolute Neutrophils 5.0 1.6 - 8.3 10e3/uL    Absolute Lymphocytes 0.5 (L) 0.8 - 5.3 10e3/uL    Absolute Monocytes 0.6 0.0 - 1.3 10e3/uL    Absolute Eosinophils 0.2 0.0 - 0.7 10e3/uL    Absolute Basophils 0.0 0.0 - 0.2 10e3/uL    Absolute Immature Granulocytes 0.0 <=0.4 10e3/uL    Absolute NRBCs 0.0 10e3/uL   CTA Head Neck with Contrast    Narrative     EXAM: CTA HEAD NECK W CONTRAST  LOCATION: [facility]  DATE/TIME: 7/11/2024 7:36 PM CDT    INDICATION: Altered mental status  COMPARISON: None.  CONTRAST:  Isovue 370 75 mL  TECHNIQUE: Head and neck CT angiogram with IV contrast. Noncontrast head CT followed by axial helical CT images of the head and neck vessels obtained during the arterial phase of intravenous contrast administration. Axial 2D reconstructed images and   multiplanar 3D MIP reconstructed images of the head and neck vessels were performed by the technologist. Dose reduction techniques were used. All stenosis measurements made according to NASCET criteria unless otherwise specified.    FINDINGS:   NONCONTRAST HEAD CT:   INTRACRANIAL CONTENTS: No intracranial hemorrhage, extraaxial collection, or mass effect.  No CT evidence of acute infarct. Mild to moderate presumed chronic small vessel ischemic changes. Small chronic lacunar infarcts in the deep gray nuclei and left   cerebellar hemisphere. Mild to moderate generalized volume loss. No hydrocephalus.     VISUALIZED ORBITS/SINUSES/MASTOIDS: No intraorbital abnormality. No significant paranasal sinus mucosal disease. No middle ear or mastoid effusion.    BONES/SOFT TISSUES: No acute abnormality.    HEAD CTA:  ANTERIOR CIRCULATION: No stenosis/occlusion, aneurysm, or high flow vascular malformation. Standard [place] anatomy.    POSTERIOR CIRCULATION:  Ectasia and beaded appearance of the left V4 with mild stenosis proximally. No occlusion, aneurysm, or high flow vascular malformation. Dominant left vertebral artery supplies the basilar artery with a small right vertebral artery   supplying the right posterior inferior cerebellar artery (PICA).     DURAL VENOUS SINUSES: Expected enhancement of the major dural venous sinuses.    NECK CTA:  RIGHT CAROTID: Calcified atherosclerotic plaque results in less than 50% stenosis in the proximal ICA. No dissection.    LEFT CAROTID:  Calcified atherosclerotic plaque results in less than 50% stenosis in the proximal ICA. No dissection.    VERTEBRAL ARTERIES:  Soft and calcified plaque results in severe distal left V1 stenosis. Focal occlusion of the left mid V2 at C5-C6 (series 11, image 175) with distal flow seen. Calcified atherosclerosis of the distal left V2 with approximately 50%   stenosis. Tortuous beaded appearance of the left V3. Dominant left and smaller right vertebral arteries.    AORTIC ARCH: Bovine origin left common carotid artery. No significant stenosis at the origin of the great vessels.    NONVASCULAR STRUCTURES: Unremarkable.      Impression    IMPRESSION:   HEAD CT:  1.  No CT evidence for acute intracranial process.  2.  Brain atrophy and presumed chronic microvascular ischemic changes as above.    HEAD CTA:   1.  No large vessel occlusion or hemodynamically significant stenosis.    NECK CTA:  1.  Bilateral proximal ICA atherosclerosis without high-grade stenosis.  2.  Multifocal stenoses of the dominant left vertebral artery with severe stenosis at the distal V1 and focal occlusion of the mid V2. Flow is seen distal to the level of occlusion.  3.  Beaded appearance of the left V3 and V4 could be seen with fibromuscular dysplasia.   MR Brain w/o & w Contrast    Narrative    EXAM: MR BRAIN W/O and W CONTRAST  LOCATION: [facility]  DATE: 7/11/2024    INDICATION: Confusion, vision changes, word finding difficulty, for 3 days  COMPARISON: CTA head and neck 7/11/2024  CONTRAST: 6mL Gadavist  TECHNIQUE: Routine multiplanar multisequence head MRI without and with intravenous contrast.    FINDINGS:  INTRACRANIAL CONTENTS: There are several scattered patchy areas of restricted diffusion representing acute infarct involving the bilateral occipital lobes and bilateral cerebellar hemispheres. Largest area at the right occipital pole measures 15 x 10 mm.   No mass, acute hemorrhage, or extra-axial fluid  collections. Patchy and confluent nonspecific T2/FLAIR hyperintensities within the cerebral white matter most consistent with moderate chronic microvascular ischemic change. There are chronic lacunar   infarcts of the bilateral corona radiata and bilateral thalami. There are a few scattered punctate foci of susceptibility artifact favored represent hypertensive chronic microhemorrhages. Mild generalized cerebral atrophy. No hydrocephalus. Normal   position of the cerebellar tonsils. No pathologic contrast enhancement.    SELLA: No abnormality accounting for technique.    OSSEOUS STRUCTURES/SOFT TISSUES: Normal marrow signal. The major intracranial vascular flow voids are maintained.     ORBITS: No abnormality accounting for technique.     SINUSES/MASTOIDS: No paranasal sinus mucosal disease. No middle ear or mastoid effusion.       Impression    IMPRESSION:  1.  Scattered patchy areas of acute infarct involving the bilateral occipital lobes and bilateral cerebellar hemispheres. No associated hemorrhage. Chronic senescent and ischemic changes, as above.           RADIOLOGY  MR Brain w/o & w Contrast   Final Result   IMPRESSION:   1.  Scattered patchy areas of acute infarct involving the bilateral occipital lobes and bilateral cerebellar hemispheres. No associated hemorrhage. Chronic senescent and ischemic changes, as above.         CTA Head Neck with Contrast   Final Result   IMPRESSION:    HEAD CT:   1.  No CT evidence for acute intracranial process.   2.  Brain atrophy and presumed chronic microvascular ischemic changes as above.      HEAD CTA:    1.  No large vessel occlusion or hemodynamically significant stenosis.      NECK CTA:   1.  Bilateral proximal ICA atherosclerosis without high-grade stenosis.   2.  Multifocal stenoses of the dominant left vertebral artery with severe stenosis at the distal V1 and focal occlusion of the mid V2. Flow is seen distal to the level of occlusion.   3.  Beaded appearance of the  left V3 and V4 could be seen with fibromuscular dysplasia.             EKG:    Rate: 61 bpm  Rhythm: Normal Sinus Rhythm  Axis: Normal  Interval: Prolonged MT  Conduction: First-degree AV block  QRS: Normal  ST: Normal  T-wave: Normal  QT: Not prolonged  Comparison EKG: No previous available for comparison  Impression:  No acute ischemic change   I have independently reviewed and interpreted today's EKG, pending Cardiologist read    PROCEDURES:  None      MEDICATIONS GIVEN IN THE EMERGENCY:  Medications   iopamidol (ISOVUE-370) solution 75 mL (75 mLs Intravenous $Given 7/11/24 1928)   gadobutrol (GADAVIST) injection 6 mL (6 mLs Intravenous $Given 7/11/24 2055)   aspirin (ASA) tablet 325 mg (325 mg Oral $Given 7/11/24 2156)   clopidogrel (PLAVIX) tablet 300 mg (300 mg Oral $Given 7/11/24 2156)       NEW PRESCRIPTIONS STARTED AT TODAY'S ER VISIT  New Prescriptions    No medications on file        I, Alena De Souza, am serving as a scribe to document services personally performed by Kelby Lim D.O., based on my observations and the provider's statements to me.  I, Kelby Lim D.O., attest that Alena De Souza is acting in a scribe capacity, has observed my performance of the services and has documented them in accordance with my direction.     Kelby Lim D.O.  Emergency Medicine  [facility] EMERGENCY ROOM  [address] [facility] [id number]  [phone]  Dept: [phone]       Kelby Lim DO  07/11/24 7454

## 2024-07-12 ENCOUNTER — APPOINTMENT (OUTPATIENT)
Dept: OCCUPATIONAL THERAPY | Facility: CLINIC | Age: 84
DRG: 064 | End: 2024-07-12
Attending: INTERNAL MEDICINE
Payer: MEDICARE

## 2024-07-12 ENCOUNTER — APPOINTMENT (OUTPATIENT)
Dept: PHYSICAL THERAPY | Facility: CLINIC | Age: 84
DRG: 064 | End: 2024-07-12
Attending: INTERNAL MEDICINE
Payer: MEDICARE

## 2024-07-12 ENCOUNTER — APPOINTMENT (OUTPATIENT)
Dept: CARDIOLOGY | Facility: CLINIC | Age: 84
DRG: 064 | End: 2024-07-12
Payer: MEDICARE

## 2024-07-12 ENCOUNTER — APPOINTMENT (OUTPATIENT)
Dept: CT IMAGING | Facility: CLINIC | Age: 84
DRG: 064 | End: 2024-07-12
Payer: MEDICARE

## 2024-07-12 ENCOUNTER — APPOINTMENT (OUTPATIENT)
Dept: CARDIOLOGY | Facility: CLINIC | Age: 84
DRG: 064 | End: 2024-07-12
Attending: INTERNAL MEDICINE
Payer: MEDICARE

## 2024-07-12 VITALS
HEART RATE: 74 BPM | BODY MASS INDEX: 21.03 KG/M2 | HEIGHT: 67 IN | RESPIRATION RATE: 28 BRPM | DIASTOLIC BLOOD PRESSURE: 77 MMHG | OXYGEN SATURATION: 98 % | WEIGHT: 134 LBS | TEMPERATURE: 97.8 F | SYSTOLIC BLOOD PRESSURE: 170 MMHG

## 2024-07-12 LAB
ANION GAP SERPL CALCULATED.3IONS-SCNC: 10 MMOL/L (ref 7–15)
BUN SERPL-MCNC: 15 MG/DL (ref 8–23)
CALCIUM SERPL-MCNC: 8.7 MG/DL (ref 8.8–10.2)
CHLORIDE SERPL-SCNC: 103 MMOL/L (ref 98–107)
CHOLEST SERPL-MCNC: 128 MG/DL
CREAT SERPL-MCNC: 0.87 MG/DL (ref 0.67–1.17)
DEPRECATED HCO3 PLAS-SCNC: 24 MMOL/L (ref 22–29)
EGFRCR SERPLBLD CKD-EPI 2021: 86 ML/MIN/1.73M2
ERYTHROCYTE [DISTWIDTH] IN BLOOD BY AUTOMATED COUNT: 14.8 % (ref 10–15)
FERRITIN SERPL-MCNC: 389 NG/ML (ref 31–409)
GLUCOSE BLDC GLUCOMTR-MCNC: 71 MG/DL (ref 70–99)
GLUCOSE SERPL-MCNC: 96 MG/DL (ref 70–99)
HCT VFR BLD AUTO: 27.6 % (ref 40–53)
HDLC SERPL-MCNC: 54 MG/DL
HGB BLD-MCNC: 9 G/DL (ref 13.3–17.7)
LDLC SERPL CALC-MCNC: 60 MG/DL
LVEF ECHO: NORMAL
MCH RBC QN AUTO: 27.2 PG (ref 26.5–33)
MCHC RBC AUTO-ENTMCNC: 32.6 G/DL (ref 31.5–36.5)
MCV RBC AUTO: 83 FL (ref 78–100)
NONHDLC SERPL-MCNC: 74 MG/DL
PLATELET # BLD AUTO: 179 10E3/UL (ref 150–450)
POTASSIUM SERPL-SCNC: 3.8 MMOL/L (ref 3.4–5.3)
RBC # BLD AUTO: 3.31 10E6/UL (ref 4.4–5.9)
SODIUM SERPL-SCNC: 137 MMOL/L (ref 135–145)
TRIGL SERPL-MCNC: 70 MG/DL
WBC # BLD AUTO: 5.2 10E3/UL (ref 4–11)

## 2024-07-12 PROCEDURE — 250N000013 HC RX MED GY IP 250 OP 250 PS 637: Performed by: INTERNAL MEDICINE

## 2024-07-12 PROCEDURE — 85027 COMPLETE CBC AUTOMATED: CPT | Performed by: INTERNAL MEDICINE

## 2024-07-12 PROCEDURE — 82962 GLUCOSE BLOOD TEST: CPT

## 2024-07-12 PROCEDURE — 258N000003 HC RX IP 258 OP 636: Performed by: FAMILY MEDICINE

## 2024-07-12 PROCEDURE — 999N000096 CARDIAC MOBILE TELEMETRY MONITOR

## 2024-07-12 PROCEDURE — 71260 CT THORAX DX C+: CPT | Mod: MG

## 2024-07-12 PROCEDURE — 97162 PT EVAL MOD COMPLEX 30 MIN: CPT | Mod: GP

## 2024-07-12 PROCEDURE — 999N000226 HC STATISTIC SLP IP EVAL DEFER

## 2024-07-12 PROCEDURE — 99239 HOSP IP/OBS DSCHRG MGMT >30: CPT | Performed by: FAMILY MEDICINE

## 2024-07-12 PROCEDURE — 97165 OT EVAL LOW COMPLEX 30 MIN: CPT | Mod: GO

## 2024-07-12 PROCEDURE — 97530 THERAPEUTIC ACTIVITIES: CPT | Mod: GP

## 2024-07-12 PROCEDURE — 80048 BASIC METABOLIC PNL TOTAL CA: CPT | Performed by: INTERNAL MEDICINE

## 2024-07-12 PROCEDURE — 97116 GAIT TRAINING THERAPY: CPT | Mod: GP

## 2024-07-12 PROCEDURE — 255N000002 HC RX 255 OP 636: Performed by: FAMILY MEDICINE

## 2024-07-12 PROCEDURE — 36415 COLL VENOUS BLD VENIPUNCTURE: CPT | Performed by: INTERNAL MEDICINE

## 2024-07-12 PROCEDURE — C8929 TTE W OR WO FOL WCON,DOPPLER: HCPCS

## 2024-07-12 PROCEDURE — G0427 INPT/ED TELECONSULT70: HCPCS | Mod: G0

## 2024-07-12 PROCEDURE — 93306 TTE W/DOPPLER COMPLETE: CPT | Mod: 26 | Performed by: INTERNAL MEDICINE

## 2024-07-12 PROCEDURE — 258N000003 HC RX IP 258 OP 636: Performed by: INTERNAL MEDICINE

## 2024-07-12 PROCEDURE — 250N000011 HC RX IP 250 OP 636: Performed by: FAMILY MEDICINE

## 2024-07-12 PROCEDURE — 250N000013 HC RX MED GY IP 250 OP 250 PS 637

## 2024-07-12 RX ORDER — ROSUVASTATIN CALCIUM 5 MG/1
5 TABLET, COATED ORAL DAILY
Status: DISCONTINUED | OUTPATIENT
Start: 2024-07-12 | End: 2024-07-12 | Stop reason: HOSPADM

## 2024-07-12 RX ORDER — ASPIRIN 81 MG/1
81 TABLET ORAL DAILY
Qty: 90 TABLET | Refills: 3 | Status: SHIPPED | OUTPATIENT
Start: 2024-07-12

## 2024-07-12 RX ORDER — FERROUS SULFATE 325(65) MG
325 TABLET ORAL EVERY OTHER DAY
Qty: 45 TABLET | Refills: 0 | Status: SHIPPED | OUTPATIENT
Start: 2024-07-12

## 2024-07-12 RX ORDER — IOPAMIDOL 755 MG/ML
90 INJECTION, SOLUTION INTRAVASCULAR ONCE
Status: COMPLETED | OUTPATIENT
Start: 2024-07-12 | End: 2024-07-12

## 2024-07-12 RX ORDER — ROSUVASTATIN CALCIUM 5 MG/1
5 TABLET, COATED ORAL DAILY
Qty: 30 TABLET | Refills: 3 | Status: SHIPPED | OUTPATIENT
Start: 2024-07-13

## 2024-07-12 RX ORDER — CLOPIDOGREL BISULFATE 75 MG/1
75 TABLET ORAL DAILY
Qty: 20 TABLET | Refills: 0 | Status: SHIPPED | OUTPATIENT
Start: 2024-07-13 | End: 2024-08-02

## 2024-07-12 RX ORDER — LISINOPRIL 10 MG/1
10 TABLET ORAL DAILY
COMMUNITY
Start: 2024-07-12

## 2024-07-12 RX ORDER — ACETAMINOPHEN 325 MG/1
650 TABLET ORAL EVERY 6 HOURS PRN
COMMUNITY
Start: 2024-07-12

## 2024-07-12 RX ORDER — DIPHENHYDRAMINE HYDROCHLORIDE 50 MG/ML
50 INJECTION INTRAMUSCULAR; INTRAVENOUS
Status: DISCONTINUED | OUTPATIENT
Start: 2024-07-12 | End: 2024-07-12 | Stop reason: HOSPADM

## 2024-07-12 RX ADMIN — ROSUVASTATIN CALCIUM 5 MG: 5 TABLET, FILM COATED ORAL at 14:35

## 2024-07-12 RX ADMIN — CLOPIDOGREL BISULFATE 75 MG: 75 TABLET ORAL at 08:50

## 2024-07-12 RX ADMIN — SIMVASTATIN 5 MG: 5 TABLET, FILM COATED ORAL at 00:47

## 2024-07-12 RX ADMIN — PERFLUTREN 2 ML: 6.52 INJECTION, SUSPENSION INTRAVENOUS at 10:12

## 2024-07-12 RX ADMIN — SODIUM CHLORIDE, PRESERVATIVE FREE: 5 INJECTION INTRAVENOUS at 00:47

## 2024-07-12 RX ADMIN — SODIUM CHLORIDE 25 MG: 9 INJECTION, SOLUTION INTRAVENOUS at 08:48

## 2024-07-12 RX ADMIN — ASPIRIN 325 MG ORAL TABLET 325 MG: 325 PILL ORAL at 08:49

## 2024-07-12 RX ADMIN — IOPAMIDOL 90 ML: 755 INJECTION, SOLUTION INTRAVENOUS at 10:22

## 2024-07-12 RX ADMIN — SODIUM CHLORIDE 975 MG: 9 INJECTION, SOLUTION INTRAVENOUS at 11:58

## 2024-07-12 ASSESSMENT — ACTIVITIES OF DAILY LIVING (ADL)
ADLS_ACUITY_SCORE: 38
ADLS_ACUITY_SCORE: 35
ADLS_ACUITY_SCORE: 38
ADLS_ACUITY_SCORE: 38
ADLS_ACUITY_SCORE: 35
ADLS_ACUITY_SCORE: 38
ADLS_ACUITY_SCORE: 35
ADLS_ACUITY_SCORE: 35
ADLS_ACUITY_SCORE: 38
DEPENDENT_IADLS:: TRANSPORTATION
ADLS_ACUITY_SCORE: 35
ADLS_ACUITY_SCORE: 38
ADLS_ACUITY_SCORE: 38
ADLS_ACUITY_SCORE: 35
ADLS_ACUITY_SCORE: 38

## 2024-07-12 NOTE — PROGRESS NOTES
07/12/24 1430   Appointment Info   Signing Clinician's Name / Credentials (OT) Nubia Raymundo, OTR/L   Living Environment   People in Home alone  (has a suportive friend, Kendy, who is like a daughter to him)   Current Living Arrangements other (see comments)  (condo)   Living Environment Comments pt has walk in shower and standard toilet   Self-Care   Activity/Exercise/Self-Care Comment Pt was ind with adls and iadls prior to admit   Instrumental Activities of Daily Living (IADL)   IADL Comments pt just drove back from [place] on sunday.  He stays there six months of the year   General Information   Onset of Illness/Injury or Date of Surgery 07/11/24   Referring Physician Paulette Guerra   Patient/Family Therapy Goal Statement (OT) go home asap   Additional Occupational Profile Info/Pertinent History of Current Problem Mr.Thomas IRVING Thompson is a 84 year old male with a past medical history of hypertension, dyslipidemia, came with blurred vision and confusion.  MRI brain revealed scattered patchy areas of acute infarct involving the bilateral occipital lobes and bilateral cerebellar hemispheres. No associated hemorrhage.   Cognitive Status Examination   Affect/Mental Status (Cognitive) WNL   Range of Motion Comprehensive   General Range of Motion no range of motion deficits identified   Strength Comprehensive (MMT)   General Manual Muscle Testing (MMT) Assessment no strength deficits identified   Bed Mobility   Bed Mobility No deficits identified   Transfers   Transfers No deficits identified   Activities of Daily Living   BADL Assessment/Intervention no deficits identified   Clinical Impression   Criteria for Skilled Therapeutic Interventions Met (OT) Evaluation only;No problems identified which require skilled intervention   OT Total Evaluation Time   OT Eval, Low Complexity Minutes (95918) 15   OT Discharge Planning   OT Plan dc OT   OT Discharge Recommendation (DC Rec) (S)  home   OT Rationale for DC Rec Pt seen for  OT eval only-pt has no skilled OT needs at this time   Total Session Time   Total Session Time (sum of timed and untimed services) 15

## 2024-07-12 NOTE — PLAN OF CARE
"  Problem: Stroke, Ischemic (Includes Transient Ischemic Attack)  Goal: Optimal Functional Ability  7/12/2024 0731 by Joann Bustillo, RN  Outcome: Progressing  7/12/2024 0731 by Joann Bustillo, RN  Outcome: Not Progressing  Intervention: Optimize Functional Ability  Recent Flowsheet Documentation  Taken 7/12/2024 0545 by Joann Bustillo, RN  Activity Management: activity adjusted per tolerance  Taken 7/12/2024 0052 by Joann Bustillo, RN  Activity Management: activity adjusted per tolerance     Goal Outcome Evaluation:  BP (!) 181/72 (BP Location: Left arm, Patient Position: Semi-Nunez's, Cuff Size: Adult Regular)   Pulse 66   Temp 98.1  F (36.7  C) (Oral)   Resp 18   Ht 1.702 m (5' 7\")   Wt 60.8 kg (134 lb)   SpO2 97%   BMI 20.99 kg/m    Pt is a/ox4, can be forgetful. Pt denied any pain, SOB, dizziness, and n/v. Pt complained of not wanting to stay but agreed to stay overnight until neurostroke eval. Pt plan to discharge today home, per pt he lives a lone. Pt ambulates without difficulty, tolerate oral intake.  Cardiac, Sr w/ PVC to 1st degree  Neuro: intact                      "

## 2024-07-12 NOTE — PROGRESS NOTES
07/12/24 1130   Appointment Info   Signing Clinician's Name / Credentials (PT) Mary Gaxiola, PT, DPT   Living Environment   People in Home alone   Current Living Arrangements other (see comments)  (condo)   Home Accessibility stairs to enter home   Number of Stairs, Main Entrance greater than 10 stairs  (15)   Stair Railings, Main Entrance railings on both sides of stairs   Self-Care   Usual Activity Tolerance good   Current Activity Tolerance moderate   Equipment Currently Used at Home none   General Information   Onset of Illness/Injury or Date of Surgery 07/11/24   Referring Physician Paulette Guerra MD   Pertinent History of Current Problem (include personal factors and/or comorbidities that impact the POC) s/p CVA with dizziness and visual disturbance   Existing Precautions/Restrictions no known precautions/restrictions   Weight-Bearing Status - LLE weight-bearing as tolerated   Weight-Bearing Status - RLE weight-bearing as tolerated   Range of Motion (ROM)   ROM Comment WFL   Strength (Manual Muscle Testing)   Strength Comments WFL   Bed Mobility   Bed Mobility supine-sit;sit-supine   Supine-Sit [place] (Bed Mobility) supervision;verbal cues   Sit-Supine [place] (Bed Mobility) supervision;verbal cues   Transfers   Transfers sit-stand transfer   Sit-Stand Transfer   Sit-Stand [place] (Transfers) contact guard;verbal cues   Assistive Device (Sit-Stand Transfers) other (see comments)  (None)   Gait/Stairs (Locomotion)   [place] Level (Gait) contact guard;verbal cues   Assistive Device (Gait) other (see comments)  (none)   Distance in Feet (Gait) 5'   Pattern (Gait) step-to;step-through   Deviations/Abnormal Patterns (Gait) joseph decreased;gait speed decreased;stride length decreased   Clinical Impression   Criteria for Skilled Therapeutic Intervention Yes, treatment indicated   PT Diagnosis (PT) impaired functional mobility   Influenced by the following impairments weakness, pain,  impaired balance   Functional limitations due to impairments gait, stairs, transfers   Clinical Presentation (PT Evaluation Complexity) stable   Clinical Presentation Rationale pt presents as medically diagnosed   Clinical Decision Making (Complexity) moderate complexity   Planned Therapy Interventions (PT) balance training;bed mobility training;gait training;home exercise program;patient/family education;ROM (range of motion);stair training;strengthening;transfer training   Risk & Benefits of therapy have been explained care plan/treatment goals reviewed;patient   PT Total Evaluation Time   PT Eval, Moderate Complexity Minutes (31303) 10   Physical Therapy Goals   PT Frequency Daily   PT Predicted Duration/Target Date for Goal Attainment 07/19/24   PT Goals Transfers;Gait;Stairs   PT: Transfers Independent;Sit to/from stand   PT: Gait Independent;150 feet;Assistive device  (SPC)   PT: Stairs Supervision/stand-by assist;Rail on right;Greater than 10 stairs  (15)   Interventions   Interventions Quick Adds Gait Training;Therapeutic Activity   Therapeutic Activity   Therapeutic Activities: dynamic activities to improve functional performance Minutes (84527) 14   Treatment Detail/Skilled Intervention supine to sit x2 reps, HOB elevated, SBA, tolerates well, sitting EOB x2 min, cueing for posture and UE/LE support, sit to/from stand, x3 reps, CGA for initial rep and SBA for remaining 2 transitions, pt steady, cueing for hand placement, standing balance x3 min without UE support, pt steady with no LOB, sit to supine x2 reps, independently   Gait Training   Gait Training Minutes (95522) 13   Symptoms Noted During/After Treatment (Gait Training) none   Treatment Detail/Skilled Intervention cueing for posture and safety, educated on FWW vs SPC, pt expressed interest in SPC for balance at home but declines interest in walker, pt steady, limited in distance by IV connection to bed, STAIRS: 15 stairs up/down with unilateral  rail, non-reciprocal patterning, steady, no LOB, SBA   Distance in Feet 20'   [place] Level (Gait Training) stand-by assist   Physical Assistance Level (Gait Training) supervision;verbal cues   Weight Bearing (Gait Training) full weight-bearing   Assistive Device (Gait Training) other (see comments)  (None)   Pattern Analysis (Gait Training) swing-to gait   Gait Analysis Deviations decreased joseph;decreased stride length;decreased step length   PT Discharge Planning   PT Plan gait (SPC vs no AD)   PT Discharge Recommendation (DC Rec) (S)  home   PT Rationale for DC Rec home with assist from family as needed   PT Brief overview of current status ambulating 20', 15 stairs with rail, SBA   PT Equipment Needed at Discharge cane, straight  (pt may want cane for home at d/c)   Total Session Time   Timed Code Treatment Minutes 27   Total Session Time (sum of timed and untimed services) 37

## 2024-07-12 NOTE — CONSULTS
"      [facility]    Stroke Consult Note    Reason for Consult:  Stroke    Chief Complaint: Altered Mental Status and Eye Problem     HPI  Juan Thompson is a 84 year old male with a PMH significant for HLD, HTN, chronic anemia. Juan presented on 7/11 with confusion and blurred vision. History obtained from Juan and Kendy (daughter). Last known well was Zaheer evening.  Juan returned from [place] on Zaheer evening.  He traveled back via car with Kendy's  and was last seen by family on Sunday.  Kendy attempted to call Juan on Monday, Tuesday, and Wednesday but there was no answer.  On Thursday morning Juan did answer her phone call and was noted to feel \"discombobulated\" and seeing spots in his vision.  Kendy also added that when they were leaving his condo to present to the ED that he needed to hang on to the walls for support when walking. Juan is unsure when the spots in his vision first started.  Juan denied any associated unilateral weakness, numbness, headache, or speech changes.  He denies any prior episodes of his presenting symptoms.  MRI brain revealed bilateral cerebellar, occipital lobe infarcts as well as on independent review with Dr. Saldaña additional left cortical frontotemporal subacute infarcts.  CTA revealed an occlusion of the mid V2 segment of the left vertebral artery with some flow is seen distal to the level of the occlusion. Presenting BP was 139/76.  Juan reports he was previously taking aspirin but was not prior to arrival.  He is on simvastatin.    Juan reports continuing to see black spots in his vision especially when watching TV yesterday evening.  He notes his vision also has wavy lines.  He denied any new neurologic symptoms today.  At baseline Juan lives independently and splits his time in [place], and [place] at his condo, and at his cabin up [place].    Reviewed Vascular Risk Factors:   - Tobacco use: Non-smoker. Quit 50 " years ago.  - Personal history of stroke/TIA: No   - Alcohol use: Reports consuming 1-3 eunice and [employer] per day, but his daughter reported greater than 3/day.  - Diet: Typically eats 2 meals per day breakfast and dinner.  For breakfast he has hash brown, egg, toast and a glass of juice.  For dinner it varies between [employer], TV dinners, chow mein.  He and Kendy report recent weight loss over the last few months which is attributed to him being in the [place] heat with little air conditioning.   - Living: Condo, cabin up [place]   - Exercise: Minimal activty, used to walk 1 mile per day but has stopped over the last 2 years   - Blood pressure: Tries to check it daily with average SBP numbers 123-125  - Atrial fibrillation: Denies family history or personal history  - B symptoms: Denies recent fevers, chills, fatigue, night sweats.  No   - Pre-cancer screenings: Not up to date, only goes to a provider once a year   - ЕЛЕНА Screening questions: Denies snoring, daytime sleepiness, and feels well rested in the AM     Stroke Evaluation Summarized  MRI/Head CT MRI Brain: Bilateral cerebellar and occipital lobe infarct. On independent review of with my attending additional subacute appearing left cortical frontal/temporal AKSHAT/MCA infarcts  CTH: No evidence of hemorrhage.    Intracranial Vasculature CTA Head: No LVO or significant stenosis    Cervical Vasculature CTA Neck: Bilateral proximal ICA atherosclerosis without significant stenosis, Multifocal stenosis of the left vertebral artery with severe stenosis at the distal V1 and focal occlusion of the mid V2, some flow is seen distal to the level of the occlusion, beaded appearance of the left V3 and V4      Echocardiogram Left ventricular function is normal. EF 55-60%. Normal right ventricle size and systolic function. The left atrium is mildly dilated. Right atrial size is normal. No evidence of left ventricular mass or tumors    EKG/Telemetry Sinus rhythm with 1st  degree A-V block    Other Testing CT CAP  1.  No acute or suspicious findings in the chest, abdomen or pelvis. No findings suspicious for a malignant process.  2.  Indeterminant but likely benign 7 mm segment 7 hepatic lesion which may represent a cyst or hemangioma. This can be followed with liver ultrasound.     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), query paroxysmal atrial fibrillation due to mild left atrium dilation on echo, CT CAP negative for obvious malignancy     Left vertebral artery mid V2 occlusion      Recommendations   - Acute stroke management  - Neuro checks and vital signs every 4 hours  - Continue SBP <200, avoid periods of hypotension given focal ICAD  - Continue Plavix 75 mg and aspirin 81mg daily together for 21 days; then aspirin 81mg alone indefinitely  - LDL 60 (goal 40-70); Initiate Crestor 5 mg, transitioned PTA simvastatin 5 mg to high intensity statin    - Long term goal BP is <130/80 to be achieved as outpatient within several weeks. Tighter control associated with improved vascular outcomes; recommend home blood pressure monitoring and keeping a BP log for primary care follow up  - Blood glucose monitoring, Hgb A1c 6.1%, (goal <7%), follow-up with PCP regarding pre-diabetes management   - Encourage Mediterranean diet and daily exercise  - PT/OT/ST consults  - Bedside Glucose Monitoring  - Euthermia, Euglycemia   - Education: Reviewed [facility] stroke warning signs with Cynthia  - Limit alcohol consumption     Diagnostic testing  - Continue telemetry while inpatient  - Check CT c/a/p to evaluate for any occult malignancy causing hypercoagulability (ordered, results above)  - MOCT Cardiac monitor for 30 days  "(ordered), to be placed before to discharge    - If Cardiac monitor is negative for arrhythmia would recommend longer cardiac monitoring with implantable loop recorder    Patient Follow-up    - in the next 1-2 week(s) with PCP  - in 6-8 weeks with general neurology or stroke JOSE ([phone]) (ordered)  - Recommend following up with ophthalmology for formal visual field testing     Thank you for this consult. No further stroke evaluation is recommended, so we will sign off. Please contact us with any additional questions.    Tracy Charles PA-C  Vascular Neurology    To page me or covering stroke neurology team member, click here: [facility]  Choose \"On Call\" tab at top, then select \"NEUROLOGY/ALL SITES\" from middle drop-down box, press Enter, then look for \"stroke\" or \"telestroke\" for your site.  _____________________________________________________    Clinically Significant Risk Factors Present on Admission                  # Hypertension: Home medication list includes antihypertensive(s)    # Anemia: based on hgb <11                       Past Medical History    No past medical history on file.  Medications   Home Meds  Prior to Admission medications    Medication Sig Start Date End Date Taking? Authorizing Provider   diclofenac (VOLTAREN) 1 % topical gel Apply 2 g topically 4 times daily as needed for moderate pain   Yes Reported, Patient   lisinopril (ZESTRIL) 10 MG tablet Take 10 mg by mouth daily 11/30/23  Yes Reported, Patient   multivitamin, therapeutic (THERA-VIT) TABS tablet Take 1 tablet by mouth daily   Yes Reported, Patient   naproxen sodium 220 MG capsule Take 220 mg by mouth 2 times daily as needed   Yes Reported, Patient   simvastatin (ZOCOR) 5 MG tablet Take 5 mg by mouth at bedtime 11/30/23  Yes Reported, Patient       Scheduled Meds  Current Facility-Administered Medications   Medication Dose Route Frequency Provider Last Rate Last Admin    aspirin (ASA) tablet 325 mg  325 mg Oral Daily " Livan Hickman MD   325 mg at 07/12/24 0849    clopidogrel (PLAVIX) tablet 75 mg  75 mg Oral or NG Tube Daily Livan Hickman MD   75 mg at 07/12/24 0850    iron dextran complex (INFED) 975 mg in sodium chloride 0.9 % 294.5 mL intermittent infusion  975 mg Intravenous Once Paulette Guerra MD 73.6 mL/hr at 07/12/24 1158 975 mg at 07/12/24 1158    rosuvastatin (CRESTOR) tablet 5 mg  5 mg Oral Daily Tracy Ceja PA-C        sodium chloride (PF) 0.9% PF flush 3 mL  3 mL Intracatheter Q8H Livan Hickman MD   3 mL at 07/12/24 0048    sodium chloride (PF) 0.9% PF flush 3 mL  3 mL Intracatheter Q8H Livan Hickman MD           Infusion Meds  Current Facility-Administered Medications   Medication Dose Route Frequency Provider Last Rate Last Admin    sodium chloride 0.9 % infusion   Intravenous Continuous Livan Hickman MD   Paused at 07/12/24 1202       Allergies   No Known Allergies       PHYSICAL EXAMINATION   Temp:  [97.8  F (36.6  C)-98.5  F (36.9  C)] 97.8  F (36.6  C)  Pulse:  [63-76] 74  Resp:  [18-36] 28  BP: (139-185)/(70-94) 170/77  SpO2:  [96 %-99 %] 98 %    General Exam  General:  patient lying in bed without any acute distress    HEENT:  normocephalic/atraumatic  Pulmonary:  no respiratory distress    Neuro Exam  Mental Status:  alert, oriented to age, month, current president, not 2nd most recent president, able to spell world backwards, able to correctly name $1.75, able to say the days of the week, follows commands, speech clear and fluent, naming and repetition normal  Cranial Nerves:  visual fields intact (tested by nurse), EOMI with normal smooth pursuit, facial sensation intact and symmetric (tested by nurse), facial movements symmetric, hearing not formally tested but intact to conversation, no dysarthria, tongue protrusion midline  Motor:  bilateral upper extremity tremors, increased effort with bilateral upper extremity elevation, no pronator drift, no leg drift     Reflexes:   unable to test (telestroke)  Sensory:  light touch sensation intact and symmetric throughout upper and lower extremities (assessed by nurse), no extinction on double simultaneous stimulation (assessed by nurse)  Coordination:  normal finger-to-nose without dysmetria, mild LLE ataxia present on heel-to-shin, normal right heel to shin   Station/Gait:  unable to test due to telestroke    Stroke Scales  NIHSS  1a. Level of Consciousness 0-->Alert, keenly responsive   1b. LOC Questions 0-->Answers both questions correctly   1c. LOC Commands 0-->Performs both tasks correctly   2.   Best Gaze 0-->Normal   3.   Visual 0-->No visual loss   4.   Facial Palsy 0-->Normal symmetrical movements   5a. Motor Arm, Left 0-->No drift, limb holds 90 (or 45) degrees for full 10 secs   5b. Motor Arm, Right 0-->No drift, limb holds 90 (or 45) degrees for full 10 secs   6a. Motor Leg, Left 0-->No drift, leg holds 30 degree position for full 5 secs   6b. Motor Leg, right 0-->No drift, leg holds 30 degree position for full 5 secs   7.   Limb Ataxia 1-->Present in one limb (LLE ataxia)   8.   Sensory 0-->Normal, no sensory loss   9.   Best Language 0-->No aphasia, normal   10. Dysarthria 0-->Normal   11. Extinction and Inattention  0-->No abnormality   Total 1 (07/12/24 1206)     Imaging  I personally reviewed all imaging; relevant findings per HPI.    Labs Data   CBC  Recent Labs   Lab 07/12/24  0605 07/11/24  1812   WBC 5.2 6.3   RBC 3.31* 3.60*   HGB 9.0* 9.7*   HCT 27.6* 30.1*    212     Basic Metabolic Panel   Recent Labs   Lab 07/12/24  0740 07/12/24  0605 07/11/24  1812   NA  --  137 137   POTASSIUM  --  3.8 4.5   CHLORIDE  --  103 103   CO2  --  24 23   BUN  --  15.0 20.2   CR  --  0.87 0.95   GLC 71 96 90   MAYKEL  --  8.7* 9.2     Liver Panel  Recent Labs   Lab 07/11/24  1812   PROTTOTAL 7.1   ALBUMIN 3.7   BILITOTAL 0.2   ALKPHOS 106   AST 17   ALT 10     INR  No lab results found.        Stroke Consult Data Data   Telestroke  Service Details  (for non-emergent stroke consult with tele)  Video start time 07/12/24   1157   Video end time 07/12/24   1301   Type of service telemedicine diagnostic assessment of acute neurological changes   Reason telemedicine is appropriate patient requires assessment with a specialist for diagnosis and treatment of neurological symptoms   Mode of transmission secure interactive audio and video communication per Itzel   Originating site (patient location) [facility]    Distant site (provider location) [facility]     I have personally spent a total of 80 minutes providing care today, time spent in reviewing medical records and devising the plan as recorded above.

## 2024-07-12 NOTE — CONSULTS
"[facility]    Stroke Telephone Note    I was called by Kelby Lim on 07/11/24 regarding patient Juan Thompson. The patient is a 84 year old male coming with confusion and blurry vision. LKW was 3 days back. NIHSS 0. MRI brain showed b/l occipital strokes.     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    Impression  Acute ischemic stroke of posterior circulation due to other etiology (vertebral artery occlusion)    Recommendations  - Use orderset: \"Ischemic Stroke Routine Admission\" or \"Ischemic Stroke No Thrombolytics/No Thrombectomy ICU Admission\"  - Place Neurology IP Stroke Consult order   - Start on  mg daily and load with Clopidogrel 300 mg , followed by 75 mg daily maintenance.  - Neurochecks and Vital Signs every 4 hours   - Permissive HTN; goal SBP < 220 mmHg  - Statin: Atorvastatin 80 mg  - TTE (with Bubble Study if age 60 yrs or less)  - Telemetry, EKG  - Bedside Glucose Monitoring  - A1c, Lipid Panel, Troponin x 3  - PT/OT/SLP  - Stroke Education  - Euthermia, Euglycemia  - Stroke and NeuroIR clinic follow up. High risk of further strokes and deterioration. Consider a life vest like alert device.    My recommendations are based on the information provided over the phone by Juan Thompson's in-person providers. They are not intended to replace the clinical judgment of his in-person providers. I was not requested to personally see or examine the patient at this time.     Frandy Patel MD        "

## 2024-07-12 NOTE — PLAN OF CARE
Speech Language Pathology: Orders received. Chart reviewed and discussed with care team.? Speech Language Pathology not indicated due to pt passed RN swallow screen and tolerating regular textures at bedside. Pt denied difficulty with speech/language. Defer discharge recommendations to PT/OT/MD.? Will complete orders.

## 2024-07-12 NOTE — MEDICATION SCRIBE - ADMISSION MEDICATION HISTORY
Medication Scribe Admission Medication History    Admission medication history is complete. The information provided in this note is only as accurate as the sources available at the time of the update.    Information Source(s): Patient, Family member, Clinic records, and [facility] via in-person    Pertinent Information: Patient recently came back from a road trip and has not had simvastatin in a few days. Did take lisinopril this morning with 220 mg of naproxen. Does not report daily aspirin use or other anticoagulants.     Daughter in room who assisted with medication history.     Changes made to PTA medication list:  Added:   Diclofenac 1% gel  Lisinopril 10 mg  Naproxen 220 mg  Simvastatin 5 mg  multivitamin  Deleted: None  Changed: None    Allergies reviewed with patient and updates made in EHR: yes    Medication History Completed By: Tuan Robert 7/11/2024 10:01 PM    PTA Med List   Medication Sig Last Dose    diclofenac (VOLTAREN) 1 % topical gel Apply 2 g topically 4 times daily as needed for moderate pain Past Week at PRN    lisinopril (ZESTRIL) 10 MG tablet Take 10 mg by mouth daily 7/11/2024 at AM    multivitamin, therapeutic (THERA-VIT) TABS tablet Take 1 tablet by mouth daily Past Week at few days ago    naproxen sodium 220 MG capsule Take 220 mg by mouth 2 times daily as needed 7/11/2024 at AM    simvastatin (ZOCOR) 5 MG tablet Take 5 mg by mouth at bedtime Past Week at 7/7

## 2024-07-12 NOTE — PROGRESS NOTES
[facility] MEDICINE PROGRESS NOTE      Identification/Summary: Juan Thompson is a 84 year old male with a past medical history of hypertension, dyslipidemia, came with blurred vision and confusion.  MRI brain revealed scattered patchy areas of acute infarct involving the bilateral occipital lobes and bilateral cerebellar hemispheres. No associated hemorrhage. Chronic senescent and ischemic changes.  Evaluated by stroke neurology.  Status post Plavix 300 mg loading dose then will resume 75 mg daily for 3 weeks and aspirin 325 mg daily indefinitely.  Permissive hypertension.  Lisinopril is on hold now.  Echocardiogram, telemetry monitoring to rule out arrhythmia, PT and OT evaluation.  Started on atorvastatin 80 mg daily.  No dysphagia or dysarthria.  Telestroke neurology evaluation today.    Assessment and Plan:  Acute ischemic stroke  MRI brain-acute infarct involving bilateral occipital lobe and bilateral cerebellar hemisphere  Status post loading dose of Plavix and aspirin  Resume Plavix 75 mg daily for 3 weeks  Aspirin 325 mg daily indefinitely  Echocardiogram  Telemetry monitoring to rule out arrhythmia  Permissive hypertension.  Lisinopril is on hold  Atorvastatin 80 mg daily  PT/OT evaluation  Stroke neurology consultation    Iron deficiency anemia  IV iron replacement    Dyslipidemia  Atorvastatin 80 mg daily  Was on simvastatin 5 mg daily at home    Essential hypertension  Lisinopril 10 mg daily, on hold due to acute stroke    Prediabetes, hemoglobin A1c 6.1  Outpatient diabetic education    Code full  Medical decision for discharge.  In 1 day    Clinically Significant Risk Factors Present on Admission                  # Hypertension: Home medication list includes antihypertensive(s)    # Anemia: based on hgb <11                      Paulette Guerra MD  [facility]  [facility]  [facility]  Phone: #[phone]  Securely message with the Vocera Web  Console (learn more here)  Text page via [facility] Paging/Directory     Interval History/Subjective:  Resting comfortably.  Still has right-sided blurred vision.  Confusion clear.  No upper or lower extremity weakness, numbness, swallow or speech problem.  No history of stroke in the past.  No other concern.    Physical Exam/Objective:  Temp:  [97.8  F (36.6  C)-98.5  F (36.9  C)] 97.8  F (36.6  C)  Pulse:  [63-76] 74  Resp:  [18-36] 28  BP: (139-185)/(70-94) 170/77  SpO2:  [96 %-99 %] 98 %  Body mass index is 20.99 kg/m .    GENERAL:  Alert, appears comfortable, in no acute distress, appears stated age   HEAD:  Normocephalic, without obvious abnormality, atraumatic   EYES:  PERRL, conjunctiva clear,   EOM's intact   NOSE: Nares normal,  mucosa normal, no drainage   THROAT: Lips, mucosa,   gums normal, mouth moist   NECK: Supple, symmetrical, trachea midline   BACK:   Symmetric, no curvature, ROM normal   LUNGS:   Clear to auscultation bilaterally, no rales, rhonchi, or wheezing, symmetric chest rise on inhalation, respirations unlabored   CHEST WALL:  No tenderness or deformity   HEART:  Regular rate and rhythm, S1 and S2 normal, no murmur, rub, or gallop    ABDOMEN:   Soft, non-tender, bowel sounds active , no masses, no organomegaly, no rebound or guarding   EXTREMITIES: No BLE edema    SKIN: No exanthems in the visualized areas   NEURO: Alert, oriented x3, moves all four extremities freely   PSYCH: Cooperative, behavior is appropriate      Data reviewed today: I personally reviewed all new medications, labs, imaging/diagnostics reports over the past 24 hours. Pertinent findings include:    Imaging:   MRI brain  Scattered patchy areas of acute infarct involving the bilateral occipital lobes and bilateral cerebellar hemispheres. No associated hemorrhage. Chronic senescent and ischemic changes, as above.     Chest and abdomen CT  1.  No acute or suspicious findings in the chest, abdomen or pelvis. No findings  suspicious for a malignant process.  2.  Indeterminant but likely benign 7 mm segment 7 hepatic lesion which may represent a cyst or hemangioma. This can be followed with liver ultrasound.    Labs:  Most Recent 3 CBC's:  Recent Labs   Lab Test 07/12/24  0605 07/11/24 1812 11/25/19  1600   WBC 5.2 6.3 7.0   HGB 9.0* 9.7* 13.5*   MCV 83 84 92    212 161     Most Recent 3 BMP's:  Recent Labs   Lab Test 07/12/24  0740 07/12/24  0605 07/11/24 1812 07/11/24  1652 11/25/19  1600   NA  --  137 137  --  139   POTASSIUM  --  3.8 4.5  --  4.0   CHLORIDE  --  103 103  --  107   CO2  --  24 23  --  20*   BUN  --  15.0 20.2  --  17   CR  --  0.87 0.95  --  0.98   ANIONGAP  --  10 11  --  12   MAYKEL  --  8.7* 9.2  --  9.1   GLC 71 96 90   < > 95    < > = values in this interval not displayed.     Most Recent Cholesterol Panel:  Recent Labs   Lab Test 07/11/24 1812   CHOL 128   LDL 60   HDL 54   TRIG 70     Most Recent Hemoglobin A1c:  Recent Labs   Lab Test 07/11/24 1812   A1C 6.1*       Medications:   Personally Reviewed.  Medications   Current Facility-Administered Medications   Medication Dose Route Frequency Provider Last Rate Last Admin    sodium chloride 0.9 % infusion   Intravenous Continuous Livan Hickman MD   Paused at 07/12/24 1202     Current Facility-Administered Medications   Medication Dose Route Frequency Provider Last Rate Last Admin    aspirin (ASA) tablet 325 mg  325 mg Oral Daily Livan Hickman MD   325 mg at 07/12/24 0849    clopidogrel (PLAVIX) tablet 75 mg  75 mg Oral or NG Tube Daily Livan Hickman MD   75 mg at 07/12/24 0850    iron dextran complex (INFED) 975 mg in sodium chloride 0.9 % 294.5 mL intermittent infusion  975 mg Intravenous Once Paulette Guerra MD 73.6 mL/hr at 07/12/24 1158 975 mg at 07/12/24 1158    rosuvastatin (CRESTOR) tablet 5 mg  5 mg Oral Daily Tracy Ceja PA-C        sodium chloride (PF) 0.9% PF flush 3 mL  3 mL Intracatheter Q8H Livan Hickman MD    3 mL at 07/12/24 0048    sodium chloride (PF) 0.9% PF flush 3 mL  3 mL Intracatheter Q8H Livan Hickman MD          Total time spent 50 min   Render Post-Care Instructions In Note?: no Consent: The patient's consent was obtained including but not limited to risks of crusting, scabbing, blistering, scarring, darker or lighter pigmentary change, recurrence, incomplete removal and infection. Show Aperture Variable?: Yes Detail Level: Detailed Post-Care Instructions: I reviewed with the patient in detail post-care instructions. Patient is to wear sunprotection, and avoid picking at any of the treated lesions. Pt may apply Vaseline to crusted or scabbing areas. Duration Of Freeze Thaw-Cycle (Seconds): 0

## 2024-07-12 NOTE — PROGRESS NOTES
Occupational Therapy Discharge Summary    Reason for therapy discharge:  pt seen for OT eval only.  He has no skilled OT needs at this time.

## 2024-07-12 NOTE — DISCHARGE SUMMARY
[facility] MEDICINE  DISCHARGE SUMMARY     Primary Care Physician: System, Provider Not In  Admission Date: 7/11/2024   Discharge Provider: Paulette Guerra MD Discharge Date: 7/12/2024   Diet:   Mediterranean diet   Code Status: Full Code   Activity: DCACTIVITY: Activity as tolerated  Daily exercise encouraged      Condition at Discharge: Stable     REASON FOR PRESENTATION(See Admission Note for Details)   Right-sided blurred vision, confusion    PRINCIPAL & ACTIVE DISCHARGE DIAGNOSES   Acute ischemic stroke, bilateral occipital lobe and bilateral cerebral hemisphere  Acute metabolic encephalopathy, resolved  Blurred vision  Iron deficiency anemia  Dyslipidemia  Essential hypertension  Prediabetes, hemoglobin A1c 6.1    PENDING LABS     Unresulted Labs Ordered in the Past 30 Days of this Admission       No orders found for last 31 day(s).             PROCEDURES ( this hospitalization only)    None      RECOMMENDATIONS TO OUTPATIENT PROVIDER FOR F/U VISIT     Follow-up Appointments     Follow-up and recommended labs and tests       - in the next 1-2 week(s) with PCP  - in 6-8 weeks with general neurology or stroke JOSE ([phone])   (ordered)  - Recommend following up with ophthalmology for formal visual field   testing          DISPOSITION     Home    SUMMARY OF HOSPITAL COURSE:      Mr.Thomas IRVING Thompson is a 84 year old male with a past medical history of hypertension, dyslipidemia, came with blurred vision and confusion.  MRI brain revealed scattered patchy areas of acute infarct involving the bilateral occipital lobes and bilateral cerebellar hemispheres. No associated hemorrhage. Chronic senescent and ischemic changes.  Evaluated by stroke neurology.  Status post 1 dose of aspirin and Plavix.  Will resume Plavix 75 mg daily for 3 weeks and aspirin 81 mg daily indefinitely.  Permissive hypertension.  Lisinopril to be started in 5-day.  Echocardiogram is stable.  Telemetry  monitoring revealed no arrhythmia.  Outpatient 30-day cardiac monitoring.  May consider loop recorder placement in future.  Started on Crestor 5 mg daily for dyslipidemia.  Simvastatin discontinued.  Hemoglobin A1c 6.2.  Outpatient diabetic education.  Mediterranean diet and daily physical exercise are encouraged.  Received PT.  Resume outpatient physical therapy.  Follow-up with ophthalmology as outpatient.  Long-term BP goal <130/80 , goal LDL 40-70.  Follow-up with primary MD in 1 to 2-week and general neurology in 6 to 8-week.    Has iron deficiency anemia without overt GI bleeding.  Received IV iron replacement.      Discharge Medications with Med changes:     Current Discharge Medication List        START taking these medications    Details   acetaminophen (TYLENOL) 325 MG tablet Take 2 tablets (650 mg) by mouth every 6 hours as needed for mild pain, other, headaches or fever (and adjunct with moderate or severe pain or per patient request)    Associated Diagnoses: Acute CVA (cerebrovascular accident) (H)      aspirin 81 MG EC tablet Take 1 tablet (81 mg) by mouth daily  Qty: 90 tablet, Refills: 3    Associated Diagnoses: Acute CVA (cerebrovascular accident) (H)      clopidogrel (PLAVIX) 75 MG tablet Take 1 tablet (75 mg) by mouth or NG Tube daily for 20 days  Qty: 20 tablet, Refills: 0    Associated Diagnoses: Acute CVA (cerebrovascular accident) (H)      rosuvastatin (CRESTOR) 5 MG tablet Take 1 tablet (5 mg) by mouth daily  Qty: 30 tablet, Refills: 3    Associated Diagnoses: Acute CVA (cerebrovascular accident) (H)           CONTINUE these medications which have CHANGED    Details   lisinopril (ZESTRIL) 10 MG tablet Take 1 tablet (10 mg) by mouth daily To be started in 5 day           CONTINUE these medications which have NOT CHANGED    Details   diclofenac (VOLTAREN) 1 % topical gel Apply 2 g topically 4 times daily as needed for moderate pain      multivitamin, therapeutic (THERA-VIT) TABS tablet Take 1  tablet by mouth daily           STOP taking these medications       naproxen sodium 220 MG capsule Comments:   Reason for Stopping:         simvastatin (ZOCOR) 5 MG tablet Comments:   Reason for Stopping:                     Rationale for medication changes:      Plavix for 3 weeks  Aspirin indefinitely  Crestor 5 mg daily  Simvastatin and naproxen as discontinued  Lisinopril to be started in 5 day        Consults   Stroke neurology    Immunizations given this encounter     Most Recent Immunizations   Administered Date(s) Administered    COVID-19 Monovalent 12+ (Pfizer 2022) 03/31/2022    COVID-19 Vaccine ([employer]) 03/09/2021           Anticoagulation Information      None      SIGNIFICANT IMAGING FINDINGS     MRI brain  Scattered patchy areas of acute infarct involving the bilateral occipital lobes and bilateral cerebellar hemispheres. No associated hemorrhage. Chronic senescent and ischemic changes, as above.      Chest and abdomen CT  1.  No acute or suspicious findings in the chest, abdomen or pelvis. No findings suspicious for a malignant process.  2.  Indeterminant but likely benign 7 mm segment 7 hepatic lesion which may represent a cyst or hemangioma. This can be followed with liver ultrasound.    Echocardiogram  Left ventricular function is normal.The ejection fraction is 55-60%.  Normal right ventricle size and systolic function.  The left atrium is mildly dilated.  SIGNIFICANT LABORATORY FINDINGS     WBC 5.2, hemoglobin 9, platelet 179  Creatinine 0.87  Total cholesterol 128, LDL 60, HDL 54, triglycerides 60  Hemoglobin A1c 6.2  Discharge Orders        Adult Diabetes Education  Referral      Physical Therapy  Referral      Reason for your hospital stay    Right-sided blurred vision and confusion     Activity    Your activity upon discharge: activity as tolerated     Follow-up and recommended labs and tests     - in the next 1-2 week(s) with PCP  - in 6-8 weeks with general neurology or  stroke JOSE ([phone]) (ordered)  - Recommend following up with ophthalmology for formal visual field testing     Diet    Follow this diet upon discharge: Mediterranean diet     Stroke Hospital Follow Up (for neurologist use only)    [employer] will call you to coordinate care as prescribed by your provider. If you don t hear from a representative within 2 business days, please call [phone].         Examination   Physical Exam   Temp:  [97.8  F (36.6  C)-98.5  F (36.9  C)] 97.8  F (36.6  C)  Pulse:  [63-76] 74  Resp:  [18-36] 28  BP: (139-185)/(70-94) 170/77  SpO2:  [96 %-99 %] 98 %  Wt Readings from Last 1 Encounters:   07/11/24 60.8 kg (134 lb)     GENERAL:  Alert, appears comfortable, in no acute distress, appears stated age   HEAD:  Normocephalic, without obvious abnormality, atraumatic   EYES:  PERRL, conjunctiva clear,   EOM's intact   NOSE: Nares normal,  mucosa normal, no drainage   THROAT: Lips, mucosa,   gums normal, mouth moist   NECK: Supple, symmetrical, trachea midline   BACK:   Symmetric, no curvature, ROM normal   LUNGS:   Clear to auscultation bilaterally, no rales, rhonchi, or wheezing, symmetric chest rise on inhalation, respirations unlabored   CHEST WALL:  No tenderness or deformity   HEART:  Regular rate and rhythm, S1 and S2 normal, no murmur, rub, or gallop    ABDOMEN:   Soft, non-tender, bowel sounds active , no masses, no organomegaly, no rebound or guarding   EXTREMITIES: No BLE edema    SKIN: No exanthems in the visualized areas   NEURO: Alert, oriented x3, moves all four extremities freely   PSYCH: Cooperative, behavior is appropriate        Please see EMR for more detailed significant labs, imaging, consultant notes etc.    IPaulette MD, personally saw the patient today and spent greater than 30 minutes discharging this patient.    Paulette Guerra MD  [facility]    CC:System, Provider Not In

## 2024-07-12 NOTE — PHARMACY-CONSULT NOTE
Pharmacy Consult to evaluate for medication related stroke core measures    Juan Thompson, 84 year old male admitted for Acute Ischemic Stroke on 7/11/2024.    Thrombolytic was not given because of Time from onset contraindications    VTE Prophylaxis SCDs /PCDs placed on 7/12, as appropriate prior to end of hospital day 2.    Antithrombotic: aspirin and clopidogrel started on 7/12, as appropriate by end of hospital day 2. Continue antithrombotic therapy on discharge to meet quality measures, unless contraindicated.    Anticoagulation if history of A-fib/flutter: Patient does not have history of A-fib/flutter - anticoagulation not required for medication related stroke core measures.     LDL Cholesterol Calculated   Date Value Ref Range Status   07/11/2024 60 <=100 mg/dL Final       Patient currently receiving changed from PTA simvastatin to Rosuvastatin continue statin on discharge to meet quality measures, unless contraindicated.    Recommendations: None at this time    Thank you for the consult.    Jaime Andrade, [facility] 7/12/2024 2:57 PM

## 2024-07-12 NOTE — H&P
[facility] MEDICINE ADMISSION HISTORY AND PHYSICAL       Assessment & Plan      This is an 84 year old white male, with acute CVA       Present on Admission (POA)    1. Acute CVA -- ischemic, left vertebral artery stenosis - Presented with blurry vision, confusion and unsteady gait. Not clear when it started. Patient back to [place] from [facility] ([facility])     MR showed - Scattered patchy areas of acute infarct involving the bilateral occipital lobes and bilateral cerebellar hemispheres. No associated hemorrhage.     - Not lytic or endovascular therapy candidate - Unclear time  - CVA order set  - Tele/pulse ox  - LD with Plavix and ASA as seen in ED  - Neuro checks   - Neuro ref  - ECHO    2. Chronic anemia, Hg of 9+   - check iron profile     Chronic Medical Conditions    1. HtN and HLD       VTE prophylaxis --  SCDs, if appropriate, add SQH  Diet --  NPO  Code Status -- Full  Barriers to discharge -- Admitting/Acute medical condition/s  Discharge Disposition and goals --  Unable to determine at this point, pending progress/treatment response.     PPE - I was wearing PPE including but not limited to - N95 mask, Gloves, and/or Safety glasses.  Admission Status --  Inpatient     Care plan is based on available information and patient's condition at encounter. Care plan was discussed and agreed to proceed by the patient/family member. Made aware that care plan may change.     I recommend to review/revise history/care plan for information/results not available during my encounter. All or some home medication/s were not resumed or was modified on admission due to safety concerns. Will have rounding AM doc  review safety to resume held/modified home medications.     Availability -- If need to coordinate care after night shift  -- Contact assigned AM rounding Hospitalist.     75 minutes spent by me on the date of service doing chart review, history, exam, diagnostic test results interpretation,  "care coordination with RN, RT and/or Pharm, & further activities per the note.      Johnny Hickman MD, MPH, FACP, [facility]  Internal Medicine - Hospitalist        Chief Complaint Vision issues      HISTORY     - Patient was seen in ED - 23. Met her daughter as well.   - Per report - he was having  blurry vision , dizziness and confusion   - He was watching TV when I met him. He can read the captions on TV at a distance of 6 feet. He said, sometimes its blurry and sometimes not. No visual loss. Not dizzy.  - He came back from [place] last Sunday ([place]) - he was not sure when his vision issues happened/started.  - When daughter called him today - he did not answer the phone. Eventually he answered it - but had difficulty using the phone. He said, \"Im in crisis\", confused\". When daughter arrived to his house, he was holding the walls to walk, felt unsteady.  - No weakness. No slurred speech. No facial droop. No headaches.   - Denies prior palpitations or syncope     - ED course/treatments/referral - CT and MR of head. Referral to Neurology. ASA and Plavix loaded.     - ROS --- No headache. No dizziness. No CP or SOB. No palpitations. No abdominal pain. No nausea or vomiting. No urinary symptoms. No bleeding symptoms. No weight loss. Rest of 12 point ROS was reviewed and negative.       Past Medical History     No past medical history on file.      Surgical History     No past surgical history on file.     Family History      No family history on file.      Social History      .  Social History     Socioeconomic History    Marital status: Single     Spouse name: Not on file    Number of children: Not on file    Years of education: Not on file    Highest education level: Not on file   Occupational History    Not on file   Tobacco Use    Smoking status: Not on file    Smokeless tobacco: Not on file   Substance and Sexual Activity    Alcohol use: Not on file    Drug use: Not on file    Sexual activity: Not on file   Other " "Topics Concern    Not on file   Social History Narrative    Not on file     Social Determinants of Health     Financial Resource Strain: Not on file   Food Insecurity: Not on file   Transportation Needs: Not on file   Physical Activity: Not on file   Stress: Not on file   Social Connections: Not on file   Interpersonal Safety: Not on file   Housing Stability: Not on file          Allergies      No Known Allergies      Prior to Admission Medications      No current facility-administered medications for this encounter.     Current Outpatient Medications   Medication Sig Dispense Refill    diclofenac (VOLTAREN) 1 % topical gel Apply 2 g topically 4 times daily as needed for moderate pain      lisinopril (ZESTRIL) 10 MG tablet Take 10 mg by mouth daily      multivitamin, therapeutic (THERA-VIT) TABS tablet Take 1 tablet by mouth daily      naproxen sodium 220 MG capsule Take 220 mg by mouth 2 times daily as needed      simvastatin (ZOCOR) 5 MG tablet Take 5 mg by mouth at bedtime             Review of Systems     A 12 point comprehensive review of systems was negative except as noted above in HPI.    PHYSICAL EXAMINATION       Vitals      Vitals: BP (!) 157/74   Pulse 64   Temp 97.9  F (36.6  C) (Oral)   Resp 18   Ht 1.702 m (5' 7\")   Wt 60.8 kg (134 lb)   SpO2 98%   BMI 20.99 kg/m    BMI= Body mass index is 20.99 kg/m .      Examination     General Appearance:  Alert, cooperative, no distress  Head:    Normocephalic, without obvious abnormality, atraumatic  EENT:  PERRL, conjunctiva/corneas clear, EOM's intact.   Neck:   Supple, symmetrical, trachea midline, no adenopathy; no NVE  Back:  Symmetric, no curvature, no CVA tenderness  Chest/Lungs: Clear to auscultation bilaterally, respirations unlabored, No tenderness or deformity. No abdominal breathing or use of accessory muscles.   Heart:    Regular rate and rhythm, S1 and S2 normal, no murmur, rub   or gallop  Abdomen: Soft, non-tender, bowel sounds active all " four quadrants, not peritoneal on palpation. Not distended  Extremities:  Extremities normal, atraumatic, no swelling   Skin:  Skin color, texture, turgor normal, no rashes or lesion  Neurologic:  Awake and alert, Pupils 2-3 mm, EOMS full and equal, can close/open eyes, tongue midline, no facial droop, motor is 5/5, no sensory deficits. Can read fonts at a distance of 6 ft from where he is laying, while in ED>         Pertinent Lab     Results for orders placed or performed during the hospital encounter of 07/11/24   CTA Head Neck with Contrast    Impression    IMPRESSION:   HEAD CT:  1.  No CT evidence for acute intracranial process.  2.  Brain atrophy and presumed chronic microvascular ischemic changes as above.    HEAD CTA:   1.  No large vessel occlusion or hemodynamically significant stenosis.    NECK CTA:  1.  Bilateral proximal ICA atherosclerosis without high-grade stenosis.  2.  Multifocal stenoses of the dominant left vertebral artery with severe stenosis at the distal V1 and focal occlusion of the mid V2. Flow is seen distal to the level of occlusion.  3.  Beaded appearance of the left V3 and V4 could be seen with fibromuscular dysplasia.   MR Brain w/o & w Contrast    Impression    IMPRESSION:  1.  Scattered patchy areas of acute infarct involving the bilateral occipital lobes and bilateral cerebellar hemispheres. No associated hemorrhage. Chronic senescent and ischemic changes, as above.     Glucose by meter   Result Value Ref Range    GLUCOSE BY METER POCT 111 (H) 70 - 99 mg/dL   Basic metabolic panel   Result Value Ref Range    Sodium 137 135 - 145 mmol/L    Potassium 4.5 3.4 - 5.3 mmol/L    Chloride 103 98 - 107 mmol/L    Carbon Dioxide (CO2) 23 22 - 29 mmol/L    Anion Gap 11 7 - 15 mmol/L    Urea Nitrogen 20.2 8.0 - 23.0 mg/dL    Creatinine 0.95 0.67 - 1.17 mg/dL    GFR Estimate 80 >60 mL/min/1.73m2    Calcium 9.2 8.8 - 10.2 mg/dL    Glucose 90 70 - 99 mg/dL   Hepatic function panel   Result Value  Ref Range    Protein Total 7.1 6.4 - 8.3 g/dL    Albumin 3.7 3.5 - 5.2 g/dL    Bilirubin Total 0.2 <=1.2 mg/dL    Alkaline Phosphatase 106 40 - 150 U/L    AST 17 0 - 45 U/L    ALT 10 0 - 70 U/L    Bilirubin Direct <0.20 0.00 - 0.30 mg/dL   UA with Microscopic reflex to Culture    Specimen: Urine, Clean Catch   Result Value Ref Range    Color Urine Yellow Colorless, Straw, Light Yellow, Yellow    Appearance Urine Clear Clear    Glucose Urine 200 (A) Negative mg/dL    Bilirubin Urine Negative Negative    Ketones Urine Negative Negative mg/dL    Specific Gravity Urine 1.025 1.001 - 1.030    Blood Urine Negative Negative    pH Urine 5.5 5.0 - 7.0    Protein Albumin Urine 20 (A) Negative mg/dL    Urobilinogen Urine <2.0 <2.0 mg/dL    Nitrite Urine Negative Negative    Leukocyte Esterase Urine Negative Negative    Mucus Urine Present (A) None Seen /LPF    RBC Urine <1 <=2 /HPF    WBC Urine 1 <=5 /HPF    Hyaline Casts Urine 1 <=2 /LPF   Troponin T, High Sensitivity (now)   Result Value Ref Range    Troponin T, High Sensitivity 12 <=22 ng/L   CBC with platelets and differential   Result Value Ref Range    WBC Count 6.3 4.0 - 11.0 10e3/uL    RBC Count 3.60 (L) 4.40 - 5.90 10e6/uL    Hemoglobin 9.7 (L) 13.3 - 17.7 g/dL    Hematocrit 30.1 (L) 40.0 - 53.0 %    MCV 84 78 - 100 fL    MCH 26.9 26.5 - 33.0 pg    MCHC 32.2 31.5 - 36.5 g/dL    RDW 14.9 10.0 - 15.0 %    Platelet Count 212 150 - 450 10e3/uL    % Neutrophils 80 %    % Lymphocytes 8 %    % Monocytes 9 %    % Eosinophils 3 %    % Basophils 0 %    % Immature Granulocytes 0 %    NRBCs per 100 WBC 0 <1 /100    Absolute Neutrophils 5.0 1.6 - 8.3 10e3/uL    Absolute Lymphocytes 0.5 (L) 0.8 - 5.3 10e3/uL    Absolute Monocytes 0.6 0.0 - 1.3 10e3/uL    Absolute Eosinophils 0.2 0.0 - 0.7 10e3/uL    Absolute Basophils 0.0 0.0 - 0.2 10e3/uL    Absolute Immature Granulocytes 0.0 <=0.4 10e3/uL    Absolute NRBCs 0.0 10e3/uL   ECG 12-LEAD WITH MUSE (LHE)   Result Value Ref Range     Systolic Blood Pressure  mmHg    Diastolic Blood Pressure  mmHg    Ventricular Rate 61 BPM    Atrial Rate 61 BPM    VT Interval 218 ms    QRS Duration 72 ms     ms    QTc 416 ms    P Axis 52 degrees    R AXIS 8 degrees    T Axis 36 degrees    Interpretation ECG       Sinus rhythm with 1st degree A-V block  Otherwise normal ECG  No previous ECGs available  Confirmed by SEE ED PROVIDER NOTE FOR, ECG INTERPRETATION ([id number]),  RAJINDER ZHENG ([id number]) on 7/11/2024 6:02:31 PM             Pertinent Radiology

## 2024-07-12 NOTE — CONSULTS
Care Management Initial Consult    General Information  Assessment completed with: Patient, Friend, Children, Other (Friend Kendy is like a daughter to pt as he has no wife or biological children), Pt and Kendy  Type of CM/SW Visit: Initial Assessment  Primary Care Provider verified and updated as needed: Yes   Readmission within the last 30 days: no previous admission in last 30 days   Reason for Consult: discharge planning, health care directive, transportation  Advance Care Planning: Advance Care Planning Reviewed: no concerns identified        Communication Assessment  Patient's communication style: spoken language (English or Bilingual)        Cognitive  Cognitive/Neuro/Behavioral:   WDL except, level of consciousness    Level of Consciousness: intermittent confusion    Arousal Level: opens eyes spontaneously    Orientation: disoriented to, situation (Needs reminder of care plan.)    Mood/Behavior: agitated, restless    Best Language: 0 - No aphasia    Speech: clear    Living Environment:   People in home: alone     Current living Arrangements: condominium      Able to return to prior arrangements: yes     Family/Social Support:  Care provided by: self, friend  Provides care for: no one, unable/limited ability to care for self  Marital Status: Single  Friend, Children, Other (specify) (Pt's friend Kendy is as close as a daughter)          Description of Support System: Involved, Supportive (As needed/able)    Support Assessment: Adequate family and caregiver support, Adequate social supports    Current Resources:   Patient receiving home care services: No  Community Resources: None  Equipment currently used at home: none  Supplies currently used at home: Other (Eye glasses)    Employment/Financial:  Employment Status: retired     Financial Concerns: none   Referral to Financial Worker: No     Does the patient's insurance plan have a 3 day qualifying hospital stay waiver?  No    Lifestyle & Psychosocial  Needs:  Social Determinants of Health     Food Insecurity: Not on file   Depression: Not on file   Housing Stability: Not on file   Tobacco Use: Medium Risk (3/31/2022)    Received from [facility]    Patient History     Smoking Tobacco Use: Former     Smokeless Tobacco Use: Unknown     Passive Exposure: Not on file   Financial Resource Strain: Not on file   Alcohol Use: Not on file   Transportation Needs: Not on file   Physical Activity: Not on file   Interpersonal Safety: Not on file   Stress: Not on file   Social Connections: Not on file   Health Literacy: Not on file     Functional Status:  Prior to admission patient needed assistance:   Dependent ADLs:: Independent  Dependent IADLs:: Transportation (Friends provide transport when needed)  Assessment of Functional Status: Not at  functional baseline    Mental Health Status:  Mental Health Status: No Current Concerns       Chemical Dependency Status:  Chemical Dependency Status: No Current Concerns           Values/Beliefs:  Spiritual, Cultural Beliefs, Baptism Practices, Values that affect care: no (None identified)             Additional Information:  Writer met with pt and his very close friend/daughter Kendy to introduce Care Management(CM), obtain an initial assessment, and offer discharge support. Pt resides in a Condo alone in the area for around a month this time of year. Pt stays at a cabin [facility] when in [facility]. Pt spends most of the year out-of-state in [place]. No DME or services usage. No concerns expressed in regard to discharge. Pt will be issued a Cane and have a Cardiac Monitor placed prior to discharge. No CM needs identified. No further consults pending. Pt has improved and will get a ride home from Kendy.  -------------------------------------------------------------------------------------------------------------------------------------------------------------------------    Care Management Discharge Note    Discharge Date: 07/12/2024      Discharge Disposition: Home, Outpatient Rehab (PT, OT, SLP, Cardiac or Pulmonary), Outside Hospital    Discharge Services: None    Discharge DME: Other (see comment) ([employer])    Discharge Transportation: family or friend will provide    Private pay costs discussed: Not applicable    Does the patient's insurance plan have a 3 day qualifying hospital stay waiver?  No     Patient/family educated on Medicare website which has current facility and service quality ratings: no    Education Provided on the Discharge Plan: No    Persons Notified of Discharge Plans: Pt, friend, bedside RN, and charge RN    Patient/Family in Agreement with the Plan: yes    Handoff Referral Completed: No    Additional Information:  Pt has no further C related needs. Pt will follow-up outpatient. Pt/Kendy were provided with community resources for future use in supporting jose rafael Larry to transport and assist as needed.    Tobin Foley RN

## 2024-07-12 NOTE — PROGRESS NOTES
Physical Therapy Discharge Summary    Reason for therapy discharge:    Discharged to home.    Progress towards therapy goal(s). See goals on Care Plan in [facility] electronic health record for goal details.  Goals not met.  Barriers to achieving goals:   discharge on same date as initial evaluation.    Therapy recommendation(s):    No further therapy is recommended. Issued SPC at discharge.

## 2024-07-26 ENCOUNTER — DOCUMENTATION ONLY (OUTPATIENT)
Dept: ORTHOPEDICS | Facility: CLINIC | Age: 84
End: 2024-07-26
Payer: MEDICARE

## 2024-07-26 DIAGNOSIS — R26.89 IMPAIRED GAIT AND MOBILITY: Primary | ICD-10-CM

## 2024-08-19 PROCEDURE — 93228 REMOTE 30 DAY ECG REV/REPORT: CPT | Performed by: INTERNAL MEDICINE

## 2024-08-20 ENCOUNTER — TELEPHONE (OUTPATIENT)
Dept: NEUROLOGY | Facility: CLINIC | Age: 84
End: 2024-08-20
Payer: MEDICARE

## 2024-08-20 NOTE — TELEPHONE ENCOUNTER
----- Message from Tracy Charles sent at 8/20/2024  7:47 AM CDT -----  Can you please call this patient and let him know that his cardiac monitoring was negative for atrial fibrillation/flutter?    Thank you,  Tracy BENJAMIN  ----- Message -----  From: Tae Quinn MD  Sent: 8/19/2024   5:36 PM CDT  To: Tracy Charles PA-C

## 2024-08-20 NOTE — TELEPHONE ENCOUNTER
Stroke RN Care Coordination - Unable to Reach / Voicemail Note     Stroke RN Care Coordinator Outreach:  Results (Cardiac Event Monitor)     Outreach attempted x 1.      Left message on patient's voicemail with call back information and requested return call.    Stroke RN Care Coordinator will try to reach patient again in 1-2 business days.    Mansi TRISTAN, RN, SCRN  RN Stroke Neurology Care Coordinator  [facility] Neuro[facility] Service Line

## 2024-08-26 NOTE — TELEPHONE ENCOUNTER
Spoke with pt and informed him of the results. Explained there is no additional cardiac testing needed at this time. He did not have any additional stroke related questions or concerns.      Mansi Min BS, RN, SCRN  RN Stroke Neurology Care Coordinator  [facility] Service Line

## 2024-10-21 ENCOUNTER — HOSPITAL ENCOUNTER (EMERGENCY)
Facility: CLINIC | Age: 84
Discharge: HOME | End: 2024-10-21
Attending: EMERGENCY MEDICINE | Admitting: EMERGENCY MEDICINE
Payer: MEDICARE

## 2024-10-21 ENCOUNTER — APPOINTMENT (OUTPATIENT)
Dept: RADIOLOGY | Facility: CLINIC | Age: 84
End: 2024-10-21
Attending: EMERGENCY MEDICINE
Payer: MEDICARE

## 2024-10-21 ENCOUNTER — APPOINTMENT (OUTPATIENT)
Dept: CT IMAGING | Facility: CLINIC | Age: 84
End: 2024-10-21
Attending: EMERGENCY MEDICINE
Payer: MEDICARE

## 2024-10-21 VITALS
BODY MASS INDEX: 20.99 KG/M2 | RESPIRATION RATE: 23 BRPM | SYSTOLIC BLOOD PRESSURE: 145 MMHG | HEIGHT: 65 IN | OXYGEN SATURATION: 98 % | WEIGHT: 126 LBS | DIASTOLIC BLOOD PRESSURE: 57 MMHG | HEART RATE: 64 BPM | TEMPERATURE: 97.2 F

## 2024-10-21 DIAGNOSIS — M25.461 EFFUSION OF RIGHT KNEE: ICD-10-CM

## 2024-10-21 DIAGNOSIS — S06.9X9A ACUTE HEAD INJURY WITH LOSS OF CONSCIOUSNESS, INITIAL ENCOUNTER (H): ICD-10-CM

## 2024-10-21 LAB
ALBUMIN SERPL BCG-MCNC: 3.8 G/DL (ref 3.5–5.2)
ALP SERPL-CCNC: 105 U/L (ref 40–150)
ALT SERPL W P-5'-P-CCNC: 8 U/L (ref 0–70)
ANION GAP SERPL CALCULATED.3IONS-SCNC: 19 MMOL/L (ref 7–15)
APTT PPP: 30 SECONDS (ref 22–38)
AST SERPL W P-5'-P-CCNC: 16 U/L (ref 0–45)
ATRIAL RATE - MUSE: 93 BPM
BASOPHILS # BLD AUTO: 0 10E3/UL (ref 0–0.2)
BASOPHILS NFR BLD AUTO: 0 %
BILIRUB SERPL-MCNC: 0.4 MG/DL
BUN SERPL-MCNC: 24.8 MG/DL (ref 8–23)
CALCIUM SERPL-MCNC: 8.8 MG/DL (ref 8.8–10.4)
CHLORIDE SERPL-SCNC: 97 MMOL/L (ref 98–107)
CREAT SERPL-MCNC: 1.07 MG/DL (ref 0.67–1.17)
DIASTOLIC BLOOD PRESSURE - MUSE: NORMAL MMHG
EGFRCR SERPLBLD CKD-EPI 2021: 69 ML/MIN/1.73M2
EOSINOPHIL # BLD AUTO: 0 10E3/UL (ref 0–0.7)
EOSINOPHIL NFR BLD AUTO: 0 %
ERYTHROCYTE [DISTWIDTH] IN BLOOD BY AUTOMATED COUNT: 14 % (ref 10–15)
GLUCOSE SERPL-MCNC: 107 MG/DL (ref 70–99)
HCO3 SERPL-SCNC: 17 MMOL/L (ref 22–29)
HCT VFR BLD AUTO: 31.7 % (ref 40–53)
HGB BLD-MCNC: 10.1 G/DL (ref 13.3–17.7)
IMM GRANULOCYTES # BLD: 0 10E3/UL
IMM GRANULOCYTES NFR BLD: 0 %
INR PPP: 1.14 (ref 0.85–1.15)
INTERPRETATION ECG - MUSE: NORMAL
LYMPHOCYTES # BLD AUTO: 0.3 10E3/UL (ref 0.8–5.3)
LYMPHOCYTES NFR BLD AUTO: 3 %
MAGNESIUM SERPL-MCNC: 2.3 MG/DL (ref 1.7–2.3)
MCH RBC QN AUTO: 27.5 PG (ref 26.5–33)
MCHC RBC AUTO-ENTMCNC: 31.9 G/DL (ref 31.5–36.5)
MCV RBC AUTO: 86 FL (ref 78–100)
MONOCYTES # BLD AUTO: 0.6 10E3/UL (ref 0–1.3)
MONOCYTES NFR BLD AUTO: 6 %
NEUTROPHILS # BLD AUTO: 9.4 10E3/UL (ref 1.6–8.3)
NEUTROPHILS NFR BLD AUTO: 90 %
NRBC # BLD AUTO: 0 10E3/UL
NRBC BLD AUTO-RTO: 0 /100
P AXIS - MUSE: 74 DEGREES
PLATELET # BLD AUTO: 271 10E3/UL (ref 150–450)
POTASSIUM SERPL-SCNC: 4.3 MMOL/L (ref 3.4–5.3)
PR INTERVAL - MUSE: 164 MS
PROT SERPL-MCNC: 7.5 G/DL (ref 6.4–8.3)
QRS DURATION - MUSE: 76 MS
QT - MUSE: 356 MS
QTC - MUSE: 442 MS
R AXIS - MUSE: 54 DEGREES
RBC # BLD AUTO: 3.67 10E6/UL (ref 4.4–5.9)
SODIUM SERPL-SCNC: 133 MMOL/L (ref 135–145)
SYSTOLIC BLOOD PRESSURE - MUSE: NORMAL MMHG
T AXIS - MUSE: 66 DEGREES
TROPONIN T SERPL HS-MCNC: 16 NG/L
TROPONIN T SERPL HS-MCNC: 17 NG/L
VENTRICULAR RATE- MUSE: 93 BPM
WBC # BLD AUTO: 10.4 10E3/UL (ref 4–11)

## 2024-10-21 PROCEDURE — 71046 X-RAY EXAM CHEST 2 VIEWS: CPT

## 2024-10-21 PROCEDURE — 85730 THROMBOPLASTIN TIME PARTIAL: CPT | Performed by: EMERGENCY MEDICINE

## 2024-10-21 PROCEDURE — 83735 ASSAY OF MAGNESIUM: CPT | Performed by: EMERGENCY MEDICINE

## 2024-10-21 PROCEDURE — 85004 AUTOMATED DIFF WBC COUNT: CPT | Performed by: EMERGENCY MEDICINE

## 2024-10-21 PROCEDURE — 250N000013 HC RX MED GY IP 250 OP 250 PS 637: Performed by: EMERGENCY MEDICINE

## 2024-10-21 PROCEDURE — 84484 ASSAY OF TROPONIN QUANT: CPT | Performed by: EMERGENCY MEDICINE

## 2024-10-21 PROCEDURE — 82040 ASSAY OF SERUM ALBUMIN: CPT | Performed by: EMERGENCY MEDICINE

## 2024-10-21 PROCEDURE — 99285 EMERGENCY DEPT VISIT HI MDM: CPT | Mod: 25

## 2024-10-21 PROCEDURE — 85610 PROTHROMBIN TIME: CPT | Performed by: EMERGENCY MEDICINE

## 2024-10-21 PROCEDURE — 93005 ELECTROCARDIOGRAM TRACING: CPT | Performed by: EMERGENCY MEDICINE

## 2024-10-21 PROCEDURE — 73562 X-RAY EXAM OF KNEE 3: CPT | Mod: RT

## 2024-10-21 PROCEDURE — 36415 COLL VENOUS BLD VENIPUNCTURE: CPT | Performed by: EMERGENCY MEDICINE

## 2024-10-21 PROCEDURE — G1010 CDSM STANSON: HCPCS

## 2024-10-21 RX ORDER — TRAMADOL HYDROCHLORIDE 50 MG/1
50 TABLET ORAL ONCE
Status: COMPLETED | OUTPATIENT
Start: 2024-10-21 | End: 2024-10-21

## 2024-10-21 RX ORDER — IBUPROFEN 400 MG/1
400 TABLET, FILM COATED ORAL ONCE
Status: COMPLETED | OUTPATIENT
Start: 2024-10-21 | End: 2024-10-21

## 2024-10-21 RX ORDER — TRAMADOL HYDROCHLORIDE 50 MG/1
50 TABLET ORAL EVERY 6 HOURS PRN
Qty: 12 TABLET | Refills: 0 | Status: SHIPPED | OUTPATIENT
Start: 2024-10-21 | End: 2024-10-21

## 2024-10-21 RX ORDER — TRAMADOL HYDROCHLORIDE 50 MG/1
50 TABLET ORAL EVERY 6 HOURS PRN
Qty: 12 TABLET | Refills: 0 | Status: SHIPPED | OUTPATIENT
Start: 2024-10-21

## 2024-10-21 RX ADMIN — TRAMADOL HYDROCHLORIDE 50 MG: 50 TABLET, COATED ORAL at 19:05

## 2024-10-21 RX ADMIN — IBUPROFEN 400 MG: 400 TABLET, FILM COATED ORAL at 15:49

## 2024-10-21 ASSESSMENT — ACTIVITIES OF DAILY LIVING (ADL)
ADLS_ACUITY_SCORE: 38

## 2024-10-21 ASSESSMENT — COLUMBIA-SUICIDE SEVERITY RATING SCALE - C-SSRS
2. HAVE YOU ACTUALLY HAD ANY THOUGHTS OF KILLING YOURSELF IN THE PAST MONTH?: NO
1. IN THE PAST MONTH, HAVE YOU WISHED YOU WERE DEAD OR WISHED YOU COULD GO TO SLEEP AND NOT WAKE UP?: NO
6. HAVE YOU EVER DONE ANYTHING, STARTED TO DO ANYTHING, OR PREPARED TO DO ANYTHING TO END YOUR LIFE?: NO

## 2024-10-21 NOTE — ED PROVIDER NOTES
EMERGENCY DEPARTMENT ENCOUNTER      NAME: Juan Thompson  AGE: 84 year old male  YOB: 1940  MRN: [id number]  EVALUATION DATE & TIME: No admission date for patient encounter.    PCP: System, Provider Not In    ED PROVIDER: Karen Lindquist DO      Chief Complaint   Patient presents with    Syncope    Knee Pain         FINAL IMPRESSION:  1. Acute head injury with loss of consciousness, initial encounter (H)    2. Effusion of right knee          ED COURSE & MEDICAL DECISION MAKIN-year-old male presented to the ED for evaluation following a fall with loss of consciousness that occurred while he was attempting to put on his pants.  The patient symptoms prior to the fall and he also denied any symptoms here in the ED other than chronic right knee pain.    To the ED the patient was hemodynamically stable.  He did not appear to be in any obvious distress or discomfort at the time of his initial evaluation.  On exam he was noted to have a superficial abrasion noted just lateral to the left eye.  Tenderness palpation noted on the anterior lower aspect of the right knee.  No other signs of trauma or injury or neurologic deficits were noted.    An EKG was obtained which revealed normal sinus rhythm without any concerning ST or T wave changes.    CBC does not show an elevated white blood cell count or signs of worsening anemia.  BMP shows a mild anion gap acidosis.  Troponin and magnesium were both reassuring.    CT scan of the head does not show any acute findings.  CT scan of the cervical spine shows mild to severe neuroforaminal narrowing noted throughout the cervical spine.  There is no evidence of acute traumatic injury, however.  Chest x-ray was nondiagnostic.  X-ray of the right knee shows a large knee joint effusion with moderate compartment narrowing.    Repeat troponin was unchanged.    The patient was reevaluated and informed of the lab and imaging results.  The exact cause of the patient's fall  remains unclear.  The most likely situation is that the patient lost his balance while attempting to put on his pants which then caused him to fall and hit his head.  The patient likely has mild retrograde amnesia regarding the events surrounding the fall which is why he cannot remember what happened.  Here in the ED the patient was not confused or altered and other than the knee pain had no significant complaints.  The knee effusion is likely due to the patient's chronic right knee pain.  The knee joint does not appear to be septic appearing at this time.  The patient was given a dose of Motrin to help treat his pain since he cannot take NSAIDs.  After educating and reassuring the patient he felt comfortable returning home.  The patient was instructed to follow-up with orthopedics for further evaluation of the right knee pain and swelling.  He was sent with a few tabs of Ultram to take as needed for any further pain.  The patient was instructed to return back to ED for any worsening knee pain or swelling, repeat falls, or any other new or concerning symptoms.    Pertinent Labs & Imaging studies reviewed. (See chart for details)    15:10 I met with the patient to gather history and to perform my initial exam. We discussed plans for the ED course, including diagnostic testing and treatment.       At the conclusion of the encounter I discussed the results of all of the tests and the disposition. The questions were answered. The patient or family acknowledged understanding and was agreeable with the care plan.     Medical Decision Making    History:  Supplemental history from: Documented in chart and Family Member/Significant Other  External Record(s) reviewed: Documented in chart    Work Up:  Chart documentation includes differential considered and any EKGs or imaging independently interpreted by provider, where specified.  In additional to work up documented, I considered the following work up: Documented in chart,  if applicable.    External consultation:  Discussion of management with another provider: Documented in chart, if applicable    Complicating factors:  Care impacted by chronic illness: Cerebrovascular Disease  Care affected by social determinants of health: N/A    Disposition considerations: Discharge. I prescribed additional prescription strength medication(s) as charted. See documentation for any additional details.        PPE worn: n95 mask, goggles    MEDICATIONS GIVEN IN THE EMERGENCY:  Medications   ibuprofen (ADVIL/MOTRIN) tablet 400 mg (400 mg Oral $Given 10/21/24 7843)   traMADol (ULTRAM) tablet 50 mg (50 mg Oral $Given 10/21/24 7169)       NEW PRESCRIPTIONS STARTED AT TODAY'S ER VISIT  Discharge Medication List as of 10/21/2024  7:01 PM             =================================================================    HPI    Patient information was obtained from: Patient and son    Use of : N/A        Juan IRVING Thompson is a 84 year old male with a pertinent history of CVA/TIA currently on Plavix who presents to this ED for evaluation of a fall with loss of conscious.  Patient states that he was attempting to put on his pants when he fell onto the floor.  The patient is not sure if his knee gave out causing him to fall and therefore hitting his head or if he had a syncopal episode and fell to the ground.  Patient states that he woke up on the floor unsure of what happened.  He denied any symptoms prior to the fall.  The patient has no complaints of any pain or injury from the fall other than some right knee pain.  The patient has a history of chronic right knee pain and the son states that the pain does not appear to be any worse than normal.  The patient denies any chest pain, shortness of breath, dizziness, nausea vomiting, diarrhea, numbness/ting/weakness in his face or extremities.  Patient denies any recent medication changes.      REVIEW OF SYSTEMS   Review of Systems     PAST MEDICAL  "HISTORY:  No past medical history on file.    PAST SURGICAL HISTORY:  No past surgical history on file.        CURRENT MEDICATIONS:    traMADol (ULTRAM) 50 MG tablet  acetaminophen (TYLENOL) 325 MG tablet  aspirin 81 MG EC tablet  clopidogrel (PLAVIX) 75 MG tablet  diclofenac (VOLTAREN) 1 % topical gel  ferrous sulfate (FEROSUL) 325 (65 Fe) MG tablet  lisinopril (ZESTRIL) 10 MG tablet  multivitamin, therapeutic (THERA-VIT) TABS tablet  rosuvastatin (CRESTOR) 5 MG tablet        ALLERGIES:  No Known Allergies    FAMILY HISTORY:  No family history on file.    SOCIAL HISTORY:   Social History     Socioeconomic History    Marital status: Single       VITALS:  BP (!) 145/57   Pulse 64   Temp 97.2  F (36.2  C) (Temporal)   Resp 23   Ht 1.651 m (5' 5\")   Wt 57.2 kg (126 lb)   SpO2 98%   BMI 20.97 kg/m      PHYSICAL EXAM    General Presentation: No apparent distress.  ENT: Ears atraumatic. Ear canals clear.  Nose atraumatic/non-tender. No septal hematoma. Face/mandible non-tender. Oropharynx clear.  Eye: Pupils equal round and reactive to light. EOMFI. No conjunctival hemorrhage. No hyphema. Eye lids normal.  Pulmonary: Spontaneous respiration. No respiratory distress. Clear equal breath sounds. Airway patent. Speech is normal. No stridor. Grossly stable chest wall. No crepitus. No chest wall tenderness to palpation noted.  Circulatory: Regular rate and rhythm. No murmurs, rubs, or gallops. Normal capillary refill.   Abdominal: Abdomen soft, non-tender and non-distended. No peritoneal signs. No flank tenderness. Normal bowel sounds. Pelvis stable/non-tender.   Neurologic: Alert and awake. Cranial nerves II-XII grossly intact.  No gross motor deficit. No gross sensory deficit. Normal upper extremity and lower extremity strength. Ira Coma Score 15.  Spine: No bony tenderness to palpation in the cervical spine, thoracic spine, lumbar spine, or sacrum.     Upper extremities:  Full range of motion. No tenderness to " palpation.  Pulses 2/4 bilateral upper extremities . No wounds, abrasions, lacerations, or contusions noted.   Lower extremities:  Full range of motion.  Minimal tenderness palpation noted to over the anterior lower right knee.  There is no significant erythema, warmth, or swelling noted within the right knee.    Pulses 2/4 bilateral lower extremities . No wounds, abrasions, lacerations, or contusions noted.   Skin: Small abrasion noted just lateral to the left eye.  Head/scalp non tender. No wounds, abrasions, lacerations, or contusions of scalp, chest, back, abdomen, flank or pelvis.          LAB:  All pertinent labs reviewed and interpreted.  Results for orders placed or performed during the hospital encounter of 10/21/24   Head CT w/o contrast    Impression    IMPRESSION:  1.  No intracranial hemorrhage, mass lesions, hydrocephalus or CT evidence for an acute infarct.  2.  Chronic lacunae as detailed above.  3.  Mild diffuse cerebral parenchymal volume loss. Presumed chronic hypertensive/microvascular ischemic white matter changes.   CT Cervical Spine w/o Contrast    Impression    IMPRESSION:  1.  No fracture or posttraumatic subluxation.  2.  Mild to moderate spinal canal narrowing at C5-C6.  3.  Moderate left and mild to moderate right neuroforaminal narrowing at C2-C3.  4.  Moderate to severe right and moderate left neuroforaminal narrowing at C3-C4.   5.  Moderate right and mild left neuroforaminal narrowing at C4-C5. Severe left and moderate to severe right neuroforaminal narrowing at C5-C6.    XR Knee Right 3 Views    Impression    IMPRESSION: No fracture. Moderate medial compartment joint space narrowing. Large knee joint effusion.   Chest XR,  PA & LAT    Impression    IMPRESSION: Negative chest.   Result Value Ref Range    INR 1.14 0.85 - 1.15   Partial thromboplastin time   Result Value Ref Range    aPTT 30 22 - 38 Seconds   Comprehensive metabolic panel   Result Value Ref Range    Sodium 133 (L) 135 -  145 mmol/L    Potassium 4.3 3.4 - 5.3 mmol/L    Carbon Dioxide (CO2) 17 (L) 22 - 29 mmol/L    Anion Gap 19 (H) 7 - 15 mmol/L    Urea Nitrogen 24.8 (H) 8.0 - 23.0 mg/dL    Creatinine 1.07 0.67 - 1.17 mg/dL    GFR Estimate 69 >60 mL/min/1.73m2    Calcium 8.8 8.8 - 10.4 mg/dL    Chloride 97 (L) 98 - 107 mmol/L    Glucose 107 (H) 70 - 99 mg/dL    Alkaline Phosphatase 105 40 - 150 U/L    AST 16 0 - 45 U/L    ALT 8 0 - 70 U/L    Protein Total 7.5 6.4 - 8.3 g/dL    Albumin 3.8 3.5 - 5.2 g/dL    Bilirubin Total 0.4 <=1.2 mg/dL   Result Value Ref Range    Magnesium 2.3 1.7 - 2.3 mg/dL   Result Value Ref Range    Troponin T, High Sensitivity 16 <=22 ng/L   CBC with platelets and differential   Result Value Ref Range    WBC Count 10.4 4.0 - 11.0 10e3/uL    RBC Count 3.67 (L) 4.40 - 5.90 10e6/uL    Hemoglobin 10.1 (L) 13.3 - 17.7 g/dL    Hematocrit 31.7 (L) 40.0 - 53.0 %    MCV 86 78 - 100 fL    MCH 27.5 26.5 - 33.0 pg    MCHC 31.9 31.5 - 36.5 g/dL    RDW 14.0 10.0 - 15.0 %    Platelet Count 271 150 - 450 10e3/uL    % Neutrophils 90 %    % Lymphocytes 3 %    % Monocytes 6 %    % Eosinophils 0 %    % Basophils 0 %    % Immature Granulocytes 0 %    NRBCs per 100 WBC 0 <1 /100    Absolute Neutrophils 9.4 (H) 1.6 - 8.3 10e3/uL    Absolute Lymphocytes 0.3 (L) 0.8 - 5.3 10e3/uL    Absolute Monocytes 0.6 0.0 - 1.3 10e3/uL    Absolute Eosinophils 0.0 0.0 - 0.7 10e3/uL    Absolute Basophils 0.0 0.0 - 0.2 10e3/uL    Absolute Immature Granulocytes 0.0 <=0.4 10e3/uL    Absolute NRBCs 0.0 10e3/uL   Result Value Ref Range    Troponin T, High Sensitivity 17 <=22 ng/L   ECG 12-LEAD WITH MUSE (L[facility])   Result Value Ref Range    Systolic Blood Pressure  mmHg    Diastolic Blood Pressure  mmHg    Ventricular Rate 93 BPM    Atrial Rate 93 BPM    NM Interval 164 ms    QRS Duration 76 ms     ms    QTc 442 ms    P Axis 74 degrees    R AXIS 54 degrees    T Axis 66 degrees    Interpretation ECG       Sinus rhythm  Normal ECG  When compared with ECG  of 11-Jul-2024 17:52,  NJ interval has decreased  Vent. rate has increased by  32 bpm  Confirmed by SEE ED PROVIDER NOTE FOR, ECG INTERPRETATION (4000),  DARVIN OAKLEY ([id number]) on 10/21/2024 2:37:30 PM         RADIOLOGY:  Reviewed all pertinent imaging. Please see official radiology report.  XR Knee Right 3 Views   Final Result   IMPRESSION: No fracture. Moderate medial compartment joint space narrowing. Large knee joint effusion.      Chest XR,  PA & LAT   Final Result   IMPRESSION: Negative chest.      Head CT w/o contrast   Final Result   IMPRESSION:   1.  No intracranial hemorrhage, mass lesions, hydrocephalus or CT evidence for an acute infarct.   2.  Chronic lacunae as detailed above.   3.  Mild diffuse cerebral parenchymal volume loss. Presumed chronic hypertensive/microvascular ischemic white matter changes.      CT Cervical Spine w/o Contrast   Final Result   IMPRESSION:   1.  No fracture or posttraumatic subluxation.   2.  Mild to moderate spinal canal narrowing at C5-C6.   3.  Moderate left and mild to moderate right neuroforaminal narrowing at C2-C3.   4.  Moderate to severe right and moderate left neuroforaminal narrowing at C3-C4.    5.  Moderate right and mild left neuroforaminal narrowing at C4-C5. Severe left and moderate to severe right neuroforaminal narrowing at C5-C6.           EKG:    Normal sinus rhythm.  Rate of 93.  Normal QRS.  Normal QT.  No ST or T wave changes.  Compared to EKG on 7/11/2024 the first-degree AV block has resolved.  No other significant changes are noted.    I have independently reviewed and interpreted the EKG(s) documented above.      Karen Lindquist DO  Emergency Medicine  [facility] EMERGENCY ROOM  [address] [facility] [address] [phone]       Karen Lindquist DO  10/22/24 0036

## 2024-10-21 NOTE — DISCHARGE INSTRUCTIONS
Other than the fluid noted within your right the your lab and imaging studies all appear reassuring.      You can continue to use over-the-counter Tylenol as needed for any further knee pain.  In addition to this you can also use the prescribed ultrasound for any further pain.  Please follow-up with orthopedics for further evaluation of the chronic right knee pain.  Follow-up with your primary care provider for routine reevaluation within the next week return back to ED sooner for any repeat syncopal episodes or any other new or concerning symptoms.

## 2024-10-21 NOTE — ED TRIAGE NOTES
Patient presents to ED with R knee pain that is worse after a fall that pt had this morning @0900, patient had syncopal episode and fell onto rug in bedroom, denies hitting head, but does have a bruise next to L eye and takes Plavix and 81 mg of ASA/day.     Triage Assessment (Adult)       Row Name 10/21/24 1416          Triage Assessment    Airway WDL WDL        Respiratory WDL    Respiratory WDL WDL        Skin Circulation/Temperature WDL    Skin Circulation/Temperature WDL WDL        Cardiac WDL    Cardiac WDL WDL        Peripheral/Neurovascular WDL    Peripheral Neurovascular WDL X;neurovascular assessment lower        Cognitive/Neuro/Behavioral WDL    Cognitive/Neuro/Behavioral WDL WDL        RLE Neurovascular Assessment    Sensation RLE tenderness present

## 2025-03-16 ENCOUNTER — HEALTH MAINTENANCE LETTER (OUTPATIENT)
Age: 85
End: 2025-03-16